# Patient Record
Sex: FEMALE | Race: BLACK OR AFRICAN AMERICAN | NOT HISPANIC OR LATINO | Employment: PART TIME | URBAN - METROPOLITAN AREA
[De-identification: names, ages, dates, MRNs, and addresses within clinical notes are randomized per-mention and may not be internally consistent; named-entity substitution may affect disease eponyms.]

---

## 2017-01-02 ENCOUNTER — THERAPY VISIT (OUTPATIENT)
Dept: PHYSICAL THERAPY | Facility: CLINIC | Age: 32
End: 2017-01-02
Payer: COMMERCIAL

## 2017-01-02 DIAGNOSIS — M25.511 RIGHT SHOULDER PAIN: Primary | ICD-10-CM

## 2017-01-02 PROCEDURE — 97110 THERAPEUTIC EXERCISES: CPT | Mod: GP | Performed by: PHYSICAL THERAPIST

## 2017-01-02 PROCEDURE — 97112 NEUROMUSCULAR REEDUCATION: CPT | Mod: GP | Performed by: PHYSICAL THERAPIST

## 2017-01-12 ENCOUNTER — THERAPY VISIT (OUTPATIENT)
Dept: PHYSICAL THERAPY | Facility: CLINIC | Age: 32
End: 2017-01-12
Payer: COMMERCIAL

## 2017-01-12 DIAGNOSIS — M25.511 RIGHT SHOULDER PAIN: Primary | ICD-10-CM

## 2017-01-12 PROCEDURE — 97110 THERAPEUTIC EXERCISES: CPT | Mod: GP | Performed by: PHYSICAL THERAPIST

## 2017-01-12 PROCEDURE — 97140 MANUAL THERAPY 1/> REGIONS: CPT | Mod: GP | Performed by: PHYSICAL THERAPIST

## 2017-01-30 ENCOUNTER — THERAPY VISIT (OUTPATIENT)
Dept: PHYSICAL THERAPY | Facility: CLINIC | Age: 32
End: 2017-01-30
Payer: COMMERCIAL

## 2017-01-30 DIAGNOSIS — M25.511 RIGHT SHOULDER PAIN: Primary | ICD-10-CM

## 2017-01-30 PROCEDURE — 97110 THERAPEUTIC EXERCISES: CPT | Mod: GP | Performed by: PHYSICAL THERAPIST

## 2017-01-30 PROCEDURE — 97112 NEUROMUSCULAR REEDUCATION: CPT | Mod: GP | Performed by: PHYSICAL THERAPIST

## 2017-04-24 ENCOUNTER — THERAPY VISIT (OUTPATIENT)
Dept: PHYSICAL THERAPY | Facility: CLINIC | Age: 32
End: 2017-04-24
Payer: COMMERCIAL

## 2017-04-24 DIAGNOSIS — M25.511 CHRONIC RIGHT SHOULDER PAIN: ICD-10-CM

## 2017-04-24 DIAGNOSIS — G89.29 CHRONIC RIGHT SHOULDER PAIN: ICD-10-CM

## 2017-04-24 PROCEDURE — 97112 NEUROMUSCULAR REEDUCATION: CPT | Mod: GP | Performed by: PHYSICAL THERAPIST

## 2017-04-24 PROCEDURE — 97110 THERAPEUTIC EXERCISES: CPT | Mod: GP | Performed by: PHYSICAL THERAPIST

## 2017-04-24 NOTE — MR AVS SNAPSHOT
"              After Visit Summary   2017    Ashley Dias    MRN: [id number]           Patient Information     Date Of Birth          1985        Visit Information        Provider Department      2017 1:45 PM Ericka Bell, PT [facility]        Today's Diagnoses     Chronic right shoulder pain           Follow-ups after your visit        Who to contact     If you have questions or need follow up information about today's clinic visit or your schedule please contact [facility] directly at [phone].  Normal or non-critical lab and imaging results will be communicated to you by GOWEXhart, letter or phone within 4 business days after the clinic has received the results. If you do not hear from us within 7 days, please contact the clinic through GOWEXhart or phone. If you have a critical or abnormal lab result, we will notify you by phone as soon as possible.  Submit refill requests through Isentropic or call your pharmacy and they will forward the refill request to us. Please allow 3 business days for your refill to be completed.          Additional Information About Your Visit        MyChart Information     Isentropic lets you send messages to your doctor, view your test results, renew your prescriptions, schedule appointments and more. To sign up, go to [email or web address] . Click on \"Log in\" on the left side of the screen, which will take you to the Welcome page. Then click on \"Sign up Now\" on the right side of the page.     You will be asked to enter the access code listed below, as well as some personal information. Please follow the directions to create your username and password.     Your access code is: [email or web address]  Expires: 2017 12:55 PM     Your access code will  in 90 days. If you need help or a new code, please call your [facility] or [phone].        Care EveryWhere ID     This is your Care EveryWhere ID. This could be used by other organizations to access " your [facility] medical records  [id number]         Blood Pressure from Last 3 Encounters:   12/05/16 114/76   11/21/16 116/72   11/18/16 117/73    Weight from Last 3 Encounters:   12/05/16 81.6 kg (180 lb)   11/21/16 81.6 kg (180 lb)   11/18/16 81.6 kg (180 lb)              We Performed the Following     NEUROMUSCULAR RE-EDUCATION     THERAPEUTIC EXERCISES        Primary Care Provider Fax #    [facility] [phone]       No address on file        Thank you!     Thank you for choosing [facility]  for your care. Our goal is always to provide you with excellent care. Hearing back from our patients is one way we can continue to improve our services. Please take a few minutes to complete the written survey that you may receive in the mail after your visit with us. Thank you!             Your Updated Medication List - Protect others around you: Learn how to safely use, store and throw away your medicines at www.disposemymeds.org.          This list is accurate as of: 4/24/17 11:59 PM.  Always use your most recent med list.                   Brand Name Dispense Instructions for use    COLACE PO          ibuprofen 400 MG tablet    ADVIL/MOTRIN    30 tablet    Take 1-2 tablets (400-800 mg) by mouth every 6 hours as needed for other (cramping)       oxyCODONE 5 MG IR tablet    ROXICODONE    20 tablet    Take 1-2 tablets (5-10 mg) by mouth every 3 hours as needed for moderate to severe pain       prenatal multivitamin  plus iron 27-0.8 MG Tabs per tablet      Take 1 tablet by mouth daily       VITAMIN D (CHOLECALCIFEROL) PO      Take by mouth daily

## 2019-02-25 ENCOUNTER — TELEPHONE (OUTPATIENT)
Dept: OBGYN | Facility: CLINIC | Age: 34
End: 2019-02-25

## 2019-02-25 NOTE — TELEPHONE ENCOUNTER
Pt is requesting to transfer care to [facility] from her previous clinic [facility] due to the fact that she just found out Dr Pina is with our group and she used to see him. Pt states she has been compliant with all prenatal appointments. Pt is 24 weeks or greater.  Last OV with current clinic: 19    Due for next OV : 2-3 weeks     4 Para 3  EDC 19, GA 34w2d    U/S done? Dating and 20 week scan (echogenic cardiac foci) but 2nd U/s was nml    Passed 1hr gtt    Previous C-sec? none    List all major health problems: none    List all complications of past deliveries (GDM, BP, etc):  none    List all current pregnancy issues:  none    List current medication list : PNV  Pt records: in Care Everywhere    Per protocol, message routed to MD, Dr Pina  for direction to okay transfer.      Suzie NIETO R.N.  [facility]

## 2019-02-26 ENCOUNTER — PRENATAL OFFICE VISIT (OUTPATIENT)
Dept: NURSING | Facility: CLINIC | Age: 34
End: 2019-02-26
Payer: COMMERCIAL

## 2019-02-26 DIAGNOSIS — Z34.80 PRENATAL CARE, SUBSEQUENT PREGNANCY: Primary | ICD-10-CM

## 2019-02-26 PROCEDURE — 99207 ZZC NO CHARGE NURSE ONLY: CPT

## 2019-02-26 NOTE — TELEPHONE ENCOUNTER
Pt is scheduled.      I will call her later to go over NPN nurse intake questions.    Suzie NIETO R.N.  [facility]

## 2019-02-26 NOTE — PROGRESS NOTES
Pt attended a NPN one on one nurse visit over the phone.  Pt is , 34w3d GA based off of U/s.  Pt is WING from [facility] as she wants to see Dr Pina.   We went over risk questions, none identified.  Pt has echogenic cardiac foci on U/s but this was cleared after a follow up U/s. Records are in Care Everywhere and labs all viewable in pt chart.    Passed 1 hr gtt (level was 114)  Pt c/o: none  Advised: n/a  Prev hx of : none  Hx of pregnancy complications: none  Hx of delivery complications: none    Last pap 16, NIL (sent to abstraction)    Hx of abnml pap: NO    Went over all information as listed in checklist for 6-12 weeks as well as clinic info and signs/sx to call for.     Suzie NIETO R.N.  [facility]

## 2019-02-26 NOTE — Clinical Note
Please abstract the following data from this visit with this patient into the appropriate field in Epic:Pap smear done on this date: 1/25/16 (approximately), by this group: [facility], results were NIL.  Records in Care Everywhere

## 2019-03-13 ENCOUNTER — PRENATAL OFFICE VISIT (OUTPATIENT)
Dept: OBGYN | Facility: CLINIC | Age: 34
End: 2019-03-13
Payer: COMMERCIAL

## 2019-03-13 VITALS — SYSTOLIC BLOOD PRESSURE: 104 MMHG | DIASTOLIC BLOOD PRESSURE: 62 MMHG | BODY MASS INDEX: 28.15 KG/M2 | WEIGHT: 174.4 LBS

## 2019-03-13 DIAGNOSIS — Z3A.36 36 WEEKS GESTATION OF PREGNANCY: Primary | ICD-10-CM

## 2019-03-13 PROCEDURE — 87653 STREP B DNA AMP PROBE: CPT | Performed by: OBSTETRICS & GYNECOLOGY

## 2019-03-13 PROCEDURE — 90471 IMMUNIZATION ADMIN: CPT | Performed by: OBSTETRICS & GYNECOLOGY

## 2019-03-13 PROCEDURE — 90715 TDAP VACCINE 7 YRS/> IM: CPT | Performed by: OBSTETRICS & GYNECOLOGY

## 2019-03-13 PROCEDURE — 99207 ZZC PRENATAL VISIT: CPT | Performed by: OBSTETRICS & GYNECOLOGY

## 2019-03-13 NOTE — NURSING NOTE
"Chief Complaint   Patient presents with     Prenatal Care     NPN patient : WING from [facility]   36w4d  GBS today    initial /62   Wt 79.1 kg (174 lb 6.4 oz)   LMP 07/25/2018 (Within Days)   BMI 28.15 kg/m   Estimated body mass index is 28.15 kg/m  as calculated from the following:    Height as of 12/5/16: 1.676 m (5' 6\").    Weight as of this encounter: 79.1 kg (174 lb 6.4 oz).  BP completed using cuff size regular.  Marisol Flaherty CMA    "

## 2019-03-13 NOTE — PROGRESS NOTES
WING from [facility] as prior patient of mine.  Pregnancy has been uncomplicated.    EIF was seen on midtrimester U/S but  ECHO normal.    GBS done today.  L&D discussed.  RTC 1 week.

## 2019-03-14 LAB
GP B STREP DNA SPEC QL NAA+PROBE: NEGATIVE
SPECIMEN SOURCE: NORMAL

## 2019-03-19 ENCOUNTER — PRENATAL OFFICE VISIT (OUTPATIENT)
Dept: OBGYN | Facility: CLINIC | Age: 34
End: 2019-03-19

## 2019-03-19 VITALS — DIASTOLIC BLOOD PRESSURE: 58 MMHG | SYSTOLIC BLOOD PRESSURE: 94 MMHG | WEIGHT: 173.4 LBS | BODY MASS INDEX: 27.99 KG/M2

## 2019-03-19 DIAGNOSIS — Z3A.36 36 WEEKS GESTATION OF PREGNANCY: Primary | ICD-10-CM

## 2019-03-19 PROCEDURE — 99207 ZZC PRENATAL VISIT: CPT | Performed by: OBSTETRICS & GYNECOLOGY

## 2019-03-19 NOTE — NURSING NOTE
"Chief Complaint   Patient presents with     Prenatal Care     Transferred from [facility]   37w3d  GBS neg  No concerns today      Initial BP 94/58   Wt 78.7 kg (173 lb 6.4 oz)   LMP 2018 (Within Days)   BMI 27.99 kg/m   Estimated body mass index is 27.99 kg/m  as calculated from the following:    Height as of 16: 1.676 m (5' 6\").    Weight as of this encounter: 78.7 kg (173 lb 6.4 oz).  BP completed using cuff size: large    Questioned patient about current smoking habits.  Pt. has never smoked.          The following HM Due: None    Marisol Flaherty CMA               "

## 2019-03-27 ENCOUNTER — PRENATAL OFFICE VISIT (OUTPATIENT)
Dept: OBGYN | Facility: CLINIC | Age: 34
End: 2019-03-27

## 2019-03-27 VITALS — WEIGHT: 171.2 LBS | SYSTOLIC BLOOD PRESSURE: 90 MMHG | DIASTOLIC BLOOD PRESSURE: 62 MMHG | BODY MASS INDEX: 27.63 KG/M2

## 2019-03-27 DIAGNOSIS — Z34.93 NORMAL PREGNANCY, THIRD TRIMESTER: Primary | ICD-10-CM

## 2019-03-27 PROCEDURE — 99207 ZZC PRENATAL VISIT: CPT | Performed by: OBSTETRICS & GYNECOLOGY

## 2019-04-02 ENCOUNTER — PRENATAL OFFICE VISIT (OUTPATIENT)
Dept: OBGYN | Facility: CLINIC | Age: 34
End: 2019-04-02
Payer: COMMERCIAL

## 2019-04-02 VITALS — DIASTOLIC BLOOD PRESSURE: 62 MMHG | SYSTOLIC BLOOD PRESSURE: 96 MMHG | WEIGHT: 171.5 LBS | BODY MASS INDEX: 27.68 KG/M2

## 2019-04-02 DIAGNOSIS — Z34.93 NORMAL PREGNANCY, THIRD TRIMESTER: Primary | ICD-10-CM

## 2019-04-02 PROCEDURE — 99207 ZZC PRENATAL VISIT: CPT | Performed by: OBSTETRICS & GYNECOLOGY

## 2019-04-02 NOTE — NURSING NOTE
"Chief Complaint   Patient presents with     Prenatal Care   39w3d  Concerned of baby position    Initial BP 96/62   Wt 77.8 kg (171 lb 8 oz)   LMP 2018 (Within Days)   BMI 27.68 kg/m   Estimated body mass index is 27.68 kg/m  as calculated from the following:    Height as of 16: 1.676 m (5' 6\").    Weight as of this encounter: 77.8 kg (171 lb 8 oz).  BP completed using cuff size: regular    Questioned patient about current smoking habits.  Pt. has never smoked.          The following HM Due: NONE    Marisol Flaherty CMA             "

## 2019-04-02 NOTE — PROGRESS NOTES
No problems.  No distinct ctx but more pressure.  Declines cervix check.  RTC 1 week if undelivered.

## 2019-04-08 ENCOUNTER — TELEPHONE (OUTPATIENT)
Dept: OBGYN | Facility: CLINIC | Age: 34
End: 2019-04-08

## 2019-04-08 NOTE — TELEPHONE ENCOUNTER
Spoke with pt. Questions answered.   Pt did have a small BM yesterday.   Pt is having some pressure. Advised that it is probably baby pushing on her pelvis.     She is using Miralax for constipation.    Pt denies contractions.     Baby is active.    Pt has a pn appt tomorrow.     Advised to call back with any changes in her symptoms.     Kamla SORENSEN RN

## 2019-04-09 ENCOUNTER — PRENATAL OFFICE VISIT (OUTPATIENT)
Dept: OBGYN | Facility: CLINIC | Age: 34
End: 2019-04-09
Payer: COMMERCIAL

## 2019-04-09 VITALS — WEIGHT: 171 LBS | BODY MASS INDEX: 27.6 KG/M2 | SYSTOLIC BLOOD PRESSURE: 90 MMHG | DIASTOLIC BLOOD PRESSURE: 60 MMHG

## 2019-04-09 DIAGNOSIS — Z3A.41 POST TERM PREGNANCY, 41 WEEKS: ICD-10-CM

## 2019-04-09 DIAGNOSIS — O48.0 POST TERM PREGNANCY, 41 WEEKS: ICD-10-CM

## 2019-04-09 DIAGNOSIS — Z34.93 NORMAL PREGNANCY, THIRD TRIMESTER: Primary | ICD-10-CM

## 2019-04-09 PROCEDURE — 99207 ZZC PRENATAL VISIT: CPT | Performed by: OBSTETRICS & GYNECOLOGY

## 2019-04-09 PROCEDURE — 59425 ANTEPARTUM CARE ONLY: CPT | Performed by: OBSTETRICS & GYNECOLOGY

## 2019-04-09 NOTE — NURSING NOTE
"Chief Complaint   Patient presents with     Prenatal Care     40 3/7 weeks       Initial BP 90/60   Wt 77.6 kg (171 lb)   LMP 2018 (Within Days)   BMI 27.60 kg/m   Estimated body mass index is 27.6 kg/m  as calculated from the following:    Height as of 16: 1.676 m (5' 6\").    Weight as of this encounter: 77.6 kg (171 lb).  BP completed using cuff size: regular    Questioned patient about current smoking habits.  Pt. has never smoked.          The following HM Due: NONE    +fetal movement  -swelling  -contractions    Mary Guerrero, CMA         "

## 2019-04-09 NOTE — PROGRESS NOTES
No problems.  Baby active.    Discussed postdates management.  Induction at 41 weeks offered and declined.  RTC 1 week with BPP.  Induction at 42 weeks advised.

## 2019-04-16 ENCOUNTER — HOSPITAL ENCOUNTER (INPATIENT)
Facility: CLINIC | Age: 34
LOS: 2 days | Discharge: HOME | End: 2019-04-18
Attending: OBSTETRICS & GYNECOLOGY | Admitting: OBSTETRICS & GYNECOLOGY
Payer: COMMERCIAL

## 2019-04-16 ENCOUNTER — NURSE TRIAGE (OUTPATIENT)
Dept: NURSING | Facility: CLINIC | Age: 34
End: 2019-04-16

## 2019-04-16 LAB
ABO + RH BLD: NORMAL
ABO + RH BLD: NORMAL
BLD GP AB SCN SERPL QL: NORMAL
BLOOD BANK CMNT PATIENT-IMP: NORMAL
HGB BLD-MCNC: 12.5 G/DL (ref 11.7–15.7)
SPECIMEN EXP DATE BLD: NORMAL

## 2019-04-16 PROCEDURE — 86850 RBC ANTIBODY SCREEN: CPT | Performed by: OBSTETRICS & GYNECOLOGY

## 2019-04-16 PROCEDURE — 25000125 ZZHC RX 250: Performed by: OBSTETRICS & GYNECOLOGY

## 2019-04-16 PROCEDURE — 86901 BLOOD TYPING SEROLOGIC RH(D): CPT | Performed by: OBSTETRICS & GYNECOLOGY

## 2019-04-16 PROCEDURE — 0KQM0ZZ REPAIR PERINEUM MUSCLE, OPEN APPROACH: ICD-10-PCS | Performed by: OBSTETRICS & GYNECOLOGY

## 2019-04-16 PROCEDURE — 85018 HEMOGLOBIN: CPT | Performed by: OBSTETRICS & GYNECOLOGY

## 2019-04-16 PROCEDURE — 25800030 ZZH RX IP 258 OP 636: Performed by: OBSTETRICS & GYNECOLOGY

## 2019-04-16 PROCEDURE — 25000128 H RX IP 250 OP 636

## 2019-04-16 PROCEDURE — 25000132 ZZH RX MED GY IP 250 OP 250 PS 637: Performed by: OBSTETRICS & GYNECOLOGY

## 2019-04-16 PROCEDURE — 36415 COLL VENOUS BLD VENIPUNCTURE: CPT | Performed by: OBSTETRICS & GYNECOLOGY

## 2019-04-16 PROCEDURE — 12000000 ZZH R&B MED SURG/OB

## 2019-04-16 PROCEDURE — 40000083 ZZH STATISTIC IP LACTATION SERVICES 1-15 MIN

## 2019-04-16 PROCEDURE — 59410 OBSTETRICAL CARE: CPT | Performed by: OBSTETRICS & GYNECOLOGY

## 2019-04-16 PROCEDURE — 86900 BLOOD TYPING SEROLOGIC ABO: CPT | Performed by: OBSTETRICS & GYNECOLOGY

## 2019-04-16 PROCEDURE — 10907ZC DRAINAGE OF AMNIOTIC FLUID, THERAPEUTIC FROM PRODUCTS OF CONCEPTION, VIA NATURAL OR ARTIFICIAL OPENING: ICD-10-PCS | Performed by: OBSTETRICS & GYNECOLOGY

## 2019-04-16 PROCEDURE — 72200001 ZZH LABOR CARE VAGINAL DELIVERY SINGLE

## 2019-04-16 RX ORDER — OXYTOCIN/0.9 % SODIUM CHLORIDE 30/500 ML
100-340 PLASTIC BAG, INJECTION (ML) INTRAVENOUS CONTINUOUS PRN
Status: COMPLETED | OUTPATIENT
Start: 2019-04-16 | End: 2019-04-16

## 2019-04-16 RX ORDER — IBUPROFEN 800 MG/1
800 TABLET, FILM COATED ORAL
Status: COMPLETED | OUTPATIENT
Start: 2019-04-16 | End: 2019-04-16

## 2019-04-16 RX ORDER — OXYTOCIN 10 [USP'U]/ML
10 INJECTION, SOLUTION INTRAMUSCULAR; INTRAVENOUS
Status: DISCONTINUED | OUTPATIENT
Start: 2019-04-16 | End: 2019-04-18 | Stop reason: HOSPADM

## 2019-04-16 RX ORDER — ONDANSETRON 2 MG/ML
4 INJECTION INTRAMUSCULAR; INTRAVENOUS EVERY 6 HOURS PRN
Status: DISCONTINUED | OUTPATIENT
Start: 2019-04-16 | End: 2019-04-16

## 2019-04-16 RX ORDER — ACETAMINOPHEN 325 MG/1
650 TABLET ORAL EVERY 4 HOURS PRN
Status: DISCONTINUED | OUTPATIENT
Start: 2019-04-16 | End: 2019-04-18 | Stop reason: HOSPADM

## 2019-04-16 RX ORDER — FENTANYL CITRATE 50 UG/ML
INJECTION, SOLUTION INTRAMUSCULAR; INTRAVENOUS
Status: COMPLETED
Start: 2019-04-16 | End: 2019-04-16

## 2019-04-16 RX ORDER — OXYTOCIN 10 [USP'U]/ML
10 INJECTION, SOLUTION INTRAMUSCULAR; INTRAVENOUS
Status: DISCONTINUED | OUTPATIENT
Start: 2019-04-16 | End: 2019-04-16

## 2019-04-16 RX ORDER — AMOXICILLIN 250 MG
1 CAPSULE ORAL 2 TIMES DAILY
Status: DISCONTINUED | OUTPATIENT
Start: 2019-04-16 | End: 2019-04-18 | Stop reason: HOSPADM

## 2019-04-16 RX ORDER — OXYCODONE AND ACETAMINOPHEN 5; 325 MG/1; MG/1
1 TABLET ORAL
Status: DISCONTINUED | OUTPATIENT
Start: 2019-04-16 | End: 2019-04-16

## 2019-04-16 RX ORDER — BISACODYL 10 MG
10 SUPPOSITORY, RECTAL RECTAL DAILY PRN
Status: DISCONTINUED | OUTPATIENT
Start: 2019-04-18 | End: 2019-04-18 | Stop reason: HOSPADM

## 2019-04-16 RX ORDER — NALOXONE HYDROCHLORIDE 0.4 MG/ML
.1-.4 INJECTION, SOLUTION INTRAMUSCULAR; INTRAVENOUS; SUBCUTANEOUS
Status: DISCONTINUED | OUTPATIENT
Start: 2019-04-16 | End: 2019-04-18 | Stop reason: HOSPADM

## 2019-04-16 RX ORDER — NALOXONE HYDROCHLORIDE 0.4 MG/ML
.1-.4 INJECTION, SOLUTION INTRAMUSCULAR; INTRAVENOUS; SUBCUTANEOUS
Status: DISCONTINUED | OUTPATIENT
Start: 2019-04-16 | End: 2019-04-16

## 2019-04-16 RX ORDER — METHYLERGONOVINE MALEATE 0.2 MG/ML
200 INJECTION INTRAVENOUS
Status: DISCONTINUED | OUTPATIENT
Start: 2019-04-16 | End: 2019-04-16

## 2019-04-16 RX ORDER — OXYTOCIN/0.9 % SODIUM CHLORIDE 30/500 ML
100 PLASTIC BAG, INJECTION (ML) INTRAVENOUS CONTINUOUS
Status: DISCONTINUED | OUTPATIENT
Start: 2019-04-16 | End: 2019-04-18 | Stop reason: HOSPADM

## 2019-04-16 RX ORDER — IBUPROFEN 800 MG/1
800 TABLET, FILM COATED ORAL EVERY 6 HOURS PRN
Status: DISCONTINUED | OUTPATIENT
Start: 2019-04-16 | End: 2019-04-18 | Stop reason: HOSPADM

## 2019-04-16 RX ORDER — LANOLIN 100 %
OINTMENT (GRAM) TOPICAL
Status: DISCONTINUED | OUTPATIENT
Start: 2019-04-16 | End: 2019-04-18 | Stop reason: HOSPADM

## 2019-04-16 RX ORDER — FENTANYL CITRATE 50 UG/ML
50-100 INJECTION, SOLUTION INTRAMUSCULAR; INTRAVENOUS
Status: DISCONTINUED | OUTPATIENT
Start: 2019-04-16 | End: 2019-04-16

## 2019-04-16 RX ORDER — AMOXICILLIN 250 MG
2 CAPSULE ORAL 2 TIMES DAILY
Status: DISCONTINUED | OUTPATIENT
Start: 2019-04-16 | End: 2019-04-18 | Stop reason: HOSPADM

## 2019-04-16 RX ORDER — ACETAMINOPHEN 325 MG/1
650 TABLET ORAL EVERY 4 HOURS PRN
Status: DISCONTINUED | OUTPATIENT
Start: 2019-04-16 | End: 2019-04-16

## 2019-04-16 RX ORDER — CARBOPROST TROMETHAMINE 250 UG/ML
250 INJECTION, SOLUTION INTRAMUSCULAR
Status: DISCONTINUED | OUTPATIENT
Start: 2019-04-16 | End: 2019-04-16

## 2019-04-16 RX ORDER — HYDROCORTISONE 2.5 %
CREAM (GRAM) TOPICAL 3 TIMES DAILY PRN
Status: DISCONTINUED | OUTPATIENT
Start: 2019-04-16 | End: 2019-04-18 | Stop reason: HOSPADM

## 2019-04-16 RX ORDER — SODIUM CHLORIDE, SODIUM LACTATE, POTASSIUM CHLORIDE, CALCIUM CHLORIDE 600; 310; 30; 20 MG/100ML; MG/100ML; MG/100ML; MG/100ML
INJECTION, SOLUTION INTRAVENOUS CONTINUOUS
Status: DISCONTINUED | OUTPATIENT
Start: 2019-04-16 | End: 2019-04-16

## 2019-04-16 RX ORDER — OXYTOCIN/0.9 % SODIUM CHLORIDE 30/500 ML
340 PLASTIC BAG, INJECTION (ML) INTRAVENOUS CONTINUOUS PRN
Status: DISCONTINUED | OUTPATIENT
Start: 2019-04-16 | End: 2019-04-18 | Stop reason: HOSPADM

## 2019-04-16 RX ADMIN — IBUPROFEN 800 MG: 800 TABLET ORAL at 08:29

## 2019-04-16 RX ADMIN — FENTANYL CITRATE 100 MCG: 50 INJECTION, SOLUTION INTRAMUSCULAR; INTRAVENOUS at 03:44

## 2019-04-16 RX ADMIN — IBUPROFEN 800 MG: 800 TABLET ORAL at 20:26

## 2019-04-16 RX ADMIN — OXYTOCIN-SODIUM CHLORIDE 0.9% IV SOLN 30 UNIT/500ML 100 ML/HR: 30-0.9/5 SOLUTION at 05:02

## 2019-04-16 RX ADMIN — OXYTOCIN-SODIUM CHLORIDE 0.9% IV SOLN 30 UNIT/500ML 340 ML/HR: 30-0.9/5 SOLUTION at 04:25

## 2019-04-16 RX ADMIN — SODIUM CHLORIDE, POTASSIUM CHLORIDE, SODIUM LACTATE AND CALCIUM CHLORIDE: 600; 310; 30; 20 INJECTION, SOLUTION INTRAVENOUS at 03:49

## 2019-04-16 RX ADMIN — ACETAMINOPHEN 650 MG: 325 TABLET, FILM COATED ORAL at 04:45

## 2019-04-16 RX ADMIN — FENTANYL CITRATE 100 MCG: 50 INJECTION INTRAMUSCULAR; INTRAVENOUS at 03:44

## 2019-04-16 RX ADMIN — SENNOSIDES AND DOCUSATE SODIUM 1 TABLET: 8.6; 5 TABLET ORAL at 20:26

## 2019-04-16 RX ADMIN — LIDOCAINE HYDROCHLORIDE 10 ML: 10 INJECTION, SOLUTION EPIDURAL; INFILTRATION; INTRACAUDAL; PERINEURAL at 04:28

## 2019-04-16 RX ADMIN — IBUPROFEN 800 MG: 800 TABLET ORAL at 14:09

## 2019-04-16 RX ADMIN — SENNOSIDES AND DOCUSATE SODIUM 1 TABLET: 8.6; 5 TABLET ORAL at 08:24

## 2019-04-16 RX ADMIN — IBUPROFEN 800 MG: 800 TABLET ORAL at 04:44

## 2019-04-16 ASSESSMENT — MIFFLIN-ST. JEOR: SCORE: 1525.54

## 2019-04-16 NOTE — PLAN OF CARE
Data: Ashley Dias transferred to [facility] via wheelchair at 0645. Baby transferred via parent's arms.  Action: Receiving unit notified of transfer: Yes. Patient and family notified of room change. Report given to Mariluz ROBIN at 0715. Belongings sent to receiving unit. Accompanied by Registered Nurse. Oriented patient to surroundings. Call light within reach. ID bands double-checked with receiving RN.  Response: Patient tolerated transfer and is stable.

## 2019-04-16 NOTE — L&D DELIVERY NOTE
"Delivery Summary    Ashley Dias MRN# [id number]   Age: 33 year old YOB: 1985     ASSESSMENT & PLAN: routine postpartum cares       Labor Event Times    Labor onset date:  19 Onset time:   1:00 AM   Dilation complete date:  19 Complete time:   4:04 AM   Start pushing date/time:  2019 0411      Labor Events     labor?:  No   steroids:  None  Labor Type:  Spontaneous  Predominate monitoring during 1st stage:  continuous electronic fetal monitoring     Antibiotics received during labor?:  No     Rupture date/time: 19 0404   Rupture type:  Artificial Rupture of Membranes  Fluid color:  Clear  Fluid odor:  Normal     1:1 continuous labor support provided by?:  RN       Delivery/Placenta Date and Time    Delivery Date:  19 Delivery Time:   4:18 AM   Placenta Date/Time:  2019  4:25 AM  Oxytocin given at the time of delivery:  after delivery of placenta     Vaginal Counts     Initial count performed by 2 team members:   Two Team Members   Ameena Taylor       Needles Suture [employer] Sponges Instruments   Initial counts 2  5    Added to count       Final counts       Placed during labor Accounted for at the end of labor   No NA   No NA   No NA    Final count performed by 2 team members:   Two Team Members   Ameena Taylor      Final count correct?:  Yes     Apgars    Living status:  Living   1 Minute 5 Minute 10 Minute 15 Minute 20 Minute   Skin color: 0  1       Heart rate: 2  2       Reflex irritability: 2  2       Muscle tone: 1  2       Respiratory effort: 2  2       Total: 7  9       Apgars assigned by:  ASHVIN ROBIN FOR 1\" AND SHI HURD,CNP FOR 5\"     Cord    Vessels:  3 Vessels Complications:  Nuchal      [place] Resuscitation    Methods:  None   Care at Delivery:  NNP asked to attend this  by Dl Taylor MD due to fentanayl dose give just 20\" prior.  Infant cried upon delivery with mild stimulation, " delayed cord clamping for ~ 45 seconds.  Infant was brought to the warmer, dried and stimulated with good response in tone, respiratory effort and color.  Breath sounds clear and equal and pink in room air.  Routine care for term infant.    Karina Parson SHI CNP  19 @ 0438 AM      Measurements    Weight:  8 lb 13.5 oz       Skin to Skin and Feeding Plan    Skin to skin initiation date/time: 1841    Skin to skin with:  Mother  Skin to skin end date/time:     How do you plan to feed your baby:  Breastfeeding     Labor Events and Shoulder Dystocia    Fetal Tracing Prior to Delivery:  Category 1  Shoulder dystocia present?:  Neg     Delivery (Maternal) (Provider to Complete) ([id number])    Episiotomy:  None  Perineal lacerations:  2nd Repaired?:  Yes   Vaginal laceration?:  No    Cervical laceration?:  No       Blood Loss  Mother: Ashley Dias #[id number]   Start of Mother's Information    IO Blood Loss  19 0100 - 19 0444    Total QBL Blood Loss (mL) Hospital Encounter 324 mL    Total  324 mL         End of Mother's Information  Mother: Ashley Dias #[id number]         Delivery - Provider to Complete (209999)    Delivering clinician:  Dl Taylor MD  Attempted Delivery Types (Choose all that apply):  Spontaneous Vaginal Delivery  Delivery Type (Choose the 1 that will go to the Birth History):  Vaginal, Spontaneous          Placenta    Delayed Cord Clamping:  Done  Date/Time:  2019  4:25 AM  Removal:  Spontaneous  Comments:  nuchal cord x's 1 reduced on perineum prior to delivery of the shoulders  Disposition:  Hospital disposal     Anesthesia    Method:  INTRAVENOUS REGIONAL   Analgesic:   BIRTH HISTORY: ANALGESIC   FENTANYL (BULK CHEMICALS - F'S)         Presentation and Position    Presentation:  Vertex   Occiput Anterior           Dl Taylor MD

## 2019-04-16 NOTE — PLAN OF CARE
Data: Vital signs within normal limits. Postpartum checks within normal limits - see flow record. Patient eating and drinking normally. Patient able to empty bladder independently and is up ambulating. Using amberly bottle  Patient performing self cares and is able to care for infant.  Action: Patient medicated during the shift for perineal discomfort and abdominal cramping See MAR. Patient reassessed within 1 hour after each medication and pain was improved - patient stated she was comfortable. Patient education done about clip board   Response: Positive attachment behaviors observed with infant.   Plan: Anticipate discharge on Thursday

## 2019-04-16 NOTE — TELEPHONE ENCOUNTER
" of 34 y/o female calls while driving, he is driving his wife tot  ER at [facility] and is calling to inform me that his wife has been having contractions for >2 hrs and the contractions are now  2-3 minutes apart, this is bay #4    RN advised to go to L&D and be evaluated, they are already on the way, RN called L&D at [facility] [phone] at 2:30AM and spoke with SHARDA Gomez  to let them know they are coming, Dr. Pina is the OB/GYN,     Jing Cassy, RN - [place] Nurse Advisor  5/17/2019  2:32AM      Reason for Disposition    [1] History of prior delivery (multipara) AND [2] contractions < 10 minutes apart AND [3] present 1 hour    Additional Information    Negative: Passed out (i.e., lost consciousness, collapsed and was not responding)    Negative: Shock suspected (e.g., cold/pale/clammy skin, too weak to stand, low BP, rapid pulse)    Negative: Difficult to awaken or acting confused  (e.g., disoriented, slurred speech)    Negative: [1] SEVERE abdominal pain (e.g., excruciating) AND [2] constant AND [3] present > 1 hour    Negative: Severe bleeding (e.g., continuous red blood from vagina, or large blood clots)    Negative: Umbilical cord hanging out of the vagina (shiny, white, curled appearance, \"like telephone cord\")    Negative: Uncontrollable urge to push (i.e., feels like baby is coming out now)    Negative: Can see baby    Negative: Sounds like a life-threatening emergency to the triager    Negative: [1] First baby (primipara) AND [2] contractions < 6 minutes apart  AND [3] present 2 hours    Protocols used: PREGNANCY - LABOR-ADULT-AH      "

## 2019-04-16 NOTE — H&P
"Ashley Dias is a 33 year old female  Estimated Date of Delivery: 2019  at 41.3 weeks admitted for eval of onset of labor. No significant change in general health status based on examination of the patient, review of Nursing Admission Database and prenatal record.  Past Medical History:   Diagnosis Date     Constipation      Dislocation     R shoulder chronic      ROS: 10 point ROS neg other than the symptoms noted above in the HPI.  Temp 97.9  F (36.6  C) (Oral)   Resp 16   Ht 1.676 m (5' 6\")   Wt 79.4 kg (175 lb)   LMP 2018 (Within Days)   Breastfeeding? No   BMI 28.25 kg/m    Constitutional: healthy, alert and mild distress in labor  Genitourinary: Normal external genitalia without lesions and cx 8 cm  100% effaced vtx 0 sta  FHT's Cat 1      EFW: 7lb 8oz    A/P: IUP 41.3 weeks active labor  ARIOM clear  Anticipate vag del  "

## 2019-04-17 PROCEDURE — 12000000 ZZH R&B MED SURG/OB

## 2019-04-17 PROCEDURE — 25000132 ZZH RX MED GY IP 250 OP 250 PS 637: Performed by: OBSTETRICS & GYNECOLOGY

## 2019-04-17 RX ADMIN — IBUPROFEN 800 MG: 800 TABLET ORAL at 04:05

## 2019-04-17 RX ADMIN — SENNOSIDES AND DOCUSATE SODIUM 2 TABLET: 8.6; 5 TABLET ORAL at 09:07

## 2019-04-17 RX ADMIN — ACETAMINOPHEN 650 MG: 325 TABLET, FILM COATED ORAL at 16:45

## 2019-04-17 RX ADMIN — ACETAMINOPHEN 650 MG: 325 TABLET, FILM COATED ORAL at 09:07

## 2019-04-17 RX ADMIN — IBUPROFEN 800 MG: 800 TABLET ORAL at 21:18

## 2019-04-17 RX ADMIN — SENNOSIDES AND DOCUSATE SODIUM 1 TABLET: 8.6; 5 TABLET ORAL at 21:19

## 2019-04-17 NOTE — PLAN OF CARE
Pt up and shaneka. Voiding without difficulty. Bonding well with baby, responding to infant cues. Breast and bottle feeding per mother request. Fundus firm and midline. Tylenol and ibuprofen effective for pain management. Tucks for amberly relief, ice offered. Education done. Continue to monitor.    Maria Del Rosario Arizmendi

## 2019-04-17 NOTE — PLAN OF CARE
Patient VSS. Independent, Walking. Breastfeeding and supplementing with formula. Bonding well with baby. Pain controlled with PRN ibuprofen. Education and support provided. Continue to monitor and assist per pt care plan and needs.

## 2019-04-17 NOTE — PROGRESS NOTES
[facility]    Post-Partum Progress Note    Assessment & Plan   Assessment:  Post-partum day #1  Normal spontaneous vaginal delivery    Doing well.  No complications identified.  No excessive bleeding  Pain well-controlled.    Plan:  Routine post partum cares  Pain control measures as needed  Discharge PPD#2    Theresa Maddox DO  OBGYN    Interval History   Doing well.  Pain is well-controlled.  No fevers.  No history of foul-smelling vaginal discharge.  Good appetite.  Denies chest pain, shortness of breath, nausea or vomiting.  Vaginal bleeding is similar to a moderate menstrual flow.  Ambulatory.     Medications     - MEDICATION INSTRUCTIONS -       NO Rho (D) immune globulin (RhoGam) needed - mother Rh POSITIVE       - MEDICATION INSTRUCTIONS -       oxytocin in 0.9% NaCl 100 mL/hr (04/16/19 0502)     oxytocin in 0.9% NaCl         senna-docusate  1 tablet Oral BID    Or     senna-docusate  2 tablet Oral BID       Physical Exam   Temp: 97.8  F (36.6  C) Temp src: Oral BP: 113/52 Pulse: 74 Heart Rate: 77 Resp: 16        Vitals:    04/16/19 0353   Weight: 79.4 kg (175 lb)     Vital Signs with Ranges  Temp:  [97.8  F (36.6  C)-98.2  F (36.8  C)] 97.8  F (36.6  C)  Pulse:  [74-87] 74  Heart Rate:  [77] 77  Resp:  [16] 16  BP: (101-114)/(48-63) 113/52  No intake/output data recorded.    General easily aroused, conversant  Uterine fundus is firm, non-tender and below the level of the umbilicus  Extremities Non-tender, trace edema  Heart good perfusion  Lungs no labored breathing    Data   Recent Labs   Lab Test 04/16/19 0355   ABO A   RH Pos   AS Neg     Recent Labs   Lab Test 04/16/19 0355   HGB 12.5     Recent Labs   Lab Test 01/25/16   RUQIGG Immune

## 2019-04-17 NOTE — PLAN OF CARE
VSS, cramping pain is controlled with ibuprofen, ambulating in room, IV is out, attempting to breast feed but primarily using formula to feed her baby; encouraged pt to work on the birth certificate and PPD; reviewed 24 hr expectations with pt.

## 2019-04-18 ENCOUNTER — NURSE TRIAGE (OUTPATIENT)
Dept: NURSING | Facility: CLINIC | Age: 34
End: 2019-04-18

## 2019-04-18 VITALS
BODY MASS INDEX: 28.12 KG/M2 | TEMPERATURE: 97.7 F | RESPIRATION RATE: 16 BRPM | HEART RATE: 94 BPM | SYSTOLIC BLOOD PRESSURE: 111 MMHG | WEIGHT: 175 LBS | DIASTOLIC BLOOD PRESSURE: 59 MMHG | HEIGHT: 66 IN

## 2019-04-18 PROCEDURE — 40000083 ZZH STATISTIC IP LACTATION SERVICES 1-15 MIN

## 2019-04-18 PROCEDURE — 25000132 ZZH RX MED GY IP 250 OP 250 PS 637: Performed by: OBSTETRICS & GYNECOLOGY

## 2019-04-18 RX ADMIN — IBUPROFEN 800 MG: 800 TABLET ORAL at 11:22

## 2019-04-18 RX ADMIN — SENNOSIDES AND DOCUSATE SODIUM 1 TABLET: 8.6; 5 TABLET ORAL at 09:22

## 2019-04-18 RX ADMIN — IBUPROFEN 800 MG: 800 TABLET ORAL at 03:44

## 2019-04-18 NOTE — PLAN OF CARE
VSS, cramping pain is controlled with pharmacological interventions and t--pump, breast feeding and supplementing her infant with formula; talked about discharge expectations, encouraged to work on paperwork but pt told she would do it tomorrow, since she does not have proper glasses.

## 2019-04-18 NOTE — PLAN OF CARE
Discharge instructions,when to call the doctor, and post partum follow up reviewed. Patient and spouse questions answered, with understanding verbalized. Patient denies further questions at this time. Patient discharged to home with family and infant at 1415.

## 2019-04-18 NOTE — PLAN OF CARE
Pt meeting expected outcomes. Vitals stable. Fundus firm and midline. Bleeding WNL. Ibuprofen controlling pain. Breast and bottle feeding . Independent with self and baby cares. Anticipated discharge home today.

## 2019-04-18 NOTE — TELEPHONE ENCOUNTER
"Patient reports she was told to take ibuprofen and stool softener after discharge from the hospital.  Patient says she did not get a prescription and has some questions.  FNA answered her questions about frequency, dose, and use.  FNA advised to have pharmacist help her if she cannot find it at the store. FNA advised to call back if patient has questions. Caller verbalizes understanding.      Additional Information    Negative: Drug overdose and nurse unable to answer question    Negative: Caller requesting information not related to medicine    Negative: Caller requesting a prescription for Strep throat and has a positive culture result    Negative: Rash while taking a medication or within 3 days of stopping it    Negative: Immunization reaction suspected    Negative: [1] Asthma and [2] having symptoms of asthma (cough, wheezing, etc)    Negative: MORE THAN A DOUBLE DOSE of a prescription or over-the-counter (OTC) drug    Negative: [1] DOUBLE DOSE (an extra dose or lesser amount) of over-the-counter (OTC) drug AND [2] any symptoms (e.g., dizziness, nausea, pain, sleepiness)    Negative: [1] DOUBLE DOSE (an extra dose or lesser amount) of prescription drug AND [2] any symptoms (e.g., dizziness, nausea, pain, sleepiness)    Negative: Took another person's prescription drug    Negative: [1] DOUBLE DOSE (an extra dose or lesser amount) of prescription drug AND [2] NO symptoms (Exception: a double dose of antibiotics)    Negative: Diabetes drug error or overdose (e.g., insulin or extra dose)    Negative: [1] Request for URGENT new prescription or refill of \"essential\" medication (i.e., likelihood of harm to patient if not taken) AND [2] triager unable to fill per unit policy    Negative: [1] Prescription not at pharmacy AND [2] was prescribed today by PCP    Negative: Pharmacy calling with prescription questions and triager unable to answer question    Negative: Caller has URGENT medication question about med that PCP " prescribed and triager unable to answer question    Negative: Caller has NON-URGENT medication question about med that PCP prescribed and triager unable to answer question    Negative: Caller requesting a NON-URGENT new prescription or refill and triager unable to refill per unit policy    Negative: Caller has medication question about med not prescribed by PCP and triager unable to answer question (e.g., compatibility with other med, storage)    Negative: [1] DOUBLE DOSE (an extra dose or lesser amount) of over-the-counter (OTC) drug AND [2] NO symptoms (all triage questions negative)    Negative: [1] DOUBLE DOSE (an extra dose or lesser amount) of antibiotic drug AND [2] NO symptoms (all triage questions negative)    Caller has medication question only, adult not sick, and triager answers question    Protocols used: [facility]ST[facility]

## 2019-04-18 NOTE — PROGRESS NOTES
Public Health Nurse (PHN) met with patient, discussed resources within [facility].  Provided family resources of [facility] Public Health services resource card, home visiting card, community resource guide and car seat information card given and discussed.  Family is aware how to add baby to insurance (phone number given at [facility] METS team) and have a primary provider arranged for baby.  Offered referral for home visiting, family declined. Patient declined any questions or concerns.

## 2019-04-18 NOTE — PLAN OF CARE
Data: Vital signs within normal limits. Postpartum checks within normal limits - see flow record. Patient eating and drinking normally. Patient able to empty bladder independently and is up ambulating. No apparent signs of infection. Perineal laceration  healing well. Patient performing self cares and is able to care for infant.  Action: Patient medicated during the shift for pain and cramping. See MAR. Patient reassessed within 1 hour after each medication and pain was improved - patient stated she was comfortable. Patient education done about breastfeeding, self cares, infant cares, resources for home. See flow record.  Response: Positive attachment behaviors observed with infant. Support persons are not present.   Plan: Anticipate discharge later today 4/18/19.

## 2019-04-18 NOTE — DISCHARGE INSTRUCTIONS
Postpartum pain control:    You will likely experience cramping for 1-2 weeks.   You can take up to 3 tabs of ibuprofen (600mg) every 6 hours as needed for pain.  You can additionally take 1-2 tabs of tylenol (325-650mg regular strength 500-1000mg extra strength), every 6 hours for additional pain control as needed.  It is best to alternate your medications so you are taking something every 3 hours if you are having continued pain.    If you have vaginal pain you can take sitz baths 1-2x/day. Fill the tub with 2-3 inches of warm water and soak the perineal and vaginal area for 10 minutes. Ice or warm packs can also be applied for comfort.    Please call the office for:  Temperature above 100.4  Severe headache, especially with changes in your vision  Heavy bleeding (more than one pad an hour for more than 2 hours in a row)  Any other new, concerning symptoms.    Constipation  You may use the following products to ease constipation:    1. Stool softeners such as metamucil or benefiber  2. senna 1-2 times daily  3. Fiber supplements  4. miralax (over the counter).  5. Dulcolax    Please be sure to keep adequately hydrated; 6-8 8oz glasses daily, more if needed to compensate for exercise, sweating, etc.      More specific dosing can be found below:    - Metamucil 28g daily PO with 8oz of water  - senna-docusate 8.6-50 MG per tablet PO 1 tablet, Oral, 2 TIMES DAILY, Start with 1 tablet PO BID, reduce to 1 tablet daily when having daily BMs. Stop for loose stools.  - docusate sodium (COLACE) capsule 100 mg, Oral, 2 TIMES DAILY, To prevent constipation. Hold for loose stools.  - bisacodyl (DULCOLAX) suppository 10 mg, Rectal, DAILY PRN, constipation, Hold for loose stools.       Please contact the clinic with any questions.  Please schedule a follow up appointment with your primary OB/Gyn provider in 6 weeks.   You can respond with [facility] or call [facility] at [phone].    Postpartum Vaginal Delivery  Instructions  Lactation: [phone]    Activity       Ask family and friends for help when you need it.    Do not place anything in your vagina for 6 weeks.    You are not restricted on other activities, but take it easy for a few weeks to allow your body to recover from delivery.  You are able to do any activities you feel up to that point.    No driving until you have stopped taking your pain medications (usually two weeks after delivery).     Call your health care provider if you have any of these symptoms:       Increased pain, swelling, redness, or fluid around your stiches from an episiotomy or perineal tear.    A fever above 100.4 F (38 C) with or without chills when placing a thermometer under your tongue.    You soak a sanitary pad with blood within 1 hour, or you see blood clots larger than a golf ball.    Bleeding that lasts more than 6 weeks.    Vaginal discharge that smells bad.    Severe pain, cramping or tenderness in your lower belly area.    A need to urinate more frequently (use the toilet more often), more urgently (use the toilet very quickly), or it burns when you urinate.    Nausea and vomiting.    Redness, swelling or pain around a vein in your leg.    Problems breastfeeding or a red or painful area on your breast.    Chest pain and cough or are gasping for air.    Problems coping with sadness, anxiety, or depression.  If you have any concerns about hurting yourself or the baby, call your provider immediately.     You have questions or concerns after you return home.     Keep your hands clean:  Always wash your hands before touching your perineal area and stitches.  This helps reduce your risk of infection.  If your hands aren't dirty, you may use an alcohol hand-rub to clean your hands. Keep your nails clean and short.

## 2019-04-18 NOTE — DISCHARGE SUMMARY
[facility]    Post-Partum Discharge Summary    Assessment & Plan   Assessment:  Post-partum day #2  Normal spontaneous vaginal delivery    Doing well.  No excessive bleeding  Pain well-controlled.  Breastfeeding and supplementing, doing well.    Plan:  Breast feeding strategies discussed  Discharge later today  Follow up 6 weeks.    Jennifer Lazo MD    Interval History   Doing well.  Pain is well-controlled.  No fevers.  No history of foul-smelling vaginal discharge.  Good appetite.  Denies chest pain, shortness of breath, nausea or vomiting.  Vaginal bleeding is similar to a heavy menstrual flow.  Ambulatory.  Breastfeeding well.    Medications     - MEDICATION INSTRUCTIONS -       NO Rho (D) immune globulin (RhoGam) needed - mother Rh POSITIVE       - MEDICATION INSTRUCTIONS -       oxytocin in 0.9% NaCl 100 mL/hr (04/16/19 0502)     oxytocin in 0.9% NaCl         senna-docusate  1 tablet Oral BID    Or     senna-docusate  2 tablet Oral BID       Physical Exam   Temp: 97.7  F (36.5  C) Temp src: Oral BP: 111/59 Pulse: 94   Resp: 16        Vitals:    04/16/19 0353   Weight: 79.4 kg (175 lb)     Vital Signs with Ranges  Temp:  [97.7  F (36.5  C)-98.6  F (37  C)] 97.7  F (36.5  C)  Pulse:  [82-94] 94  Resp:  [16-18] 16  BP: (103-111)/(49-59) 111/59  No intake/output data recorded.    Uterine fundus is firm, non-tender and at the level of the umbilicus  Extremities Non-tender    Data   Recent Labs   Lab Test 04/16/19 0355   ABO A   RH Pos   AS Neg     Recent Labs   Lab Test 04/16/19 0355   HGB 12.5     Recent Labs   Lab Test 01/25/16   RUQIGG Immune

## 2019-07-12 ENCOUNTER — PRENATAL OFFICE VISIT (OUTPATIENT)
Dept: OBGYN | Facility: CLINIC | Age: 34
End: 2019-07-12
Payer: COMMERCIAL

## 2019-07-12 VITALS — WEIGHT: 165.1 LBS | DIASTOLIC BLOOD PRESSURE: 64 MMHG | SYSTOLIC BLOOD PRESSURE: 96 MMHG | BODY MASS INDEX: 26.65 KG/M2

## 2019-07-12 DIAGNOSIS — M25.50 MULTIPLE JOINT PAIN: ICD-10-CM

## 2019-07-12 DIAGNOSIS — Z12.4 SCREENING FOR CERVICAL CANCER: ICD-10-CM

## 2019-07-12 PROCEDURE — 99207 ZZC POST PARTUM EXAM: CPT | Performed by: OBSTETRICS & GYNECOLOGY

## 2019-07-12 PROCEDURE — 36415 COLL VENOUS BLD VENIPUNCTURE: CPT | Performed by: OBSTETRICS & GYNECOLOGY

## 2019-07-12 PROCEDURE — 82306 VITAMIN D 25 HYDROXY: CPT | Performed by: OBSTETRICS & GYNECOLOGY

## 2019-07-12 PROCEDURE — G0476 HPV COMBO ASSAY CA SCREEN: HCPCS | Performed by: OBSTETRICS & GYNECOLOGY

## 2019-07-12 PROCEDURE — G0145 SCR C/V CYTO,THINLAYER,RESCR: HCPCS | Performed by: OBSTETRICS & GYNECOLOGY

## 2019-07-12 RX ORDER — BREAST PUMP
1 EACH MISCELLANEOUS PRN
Qty: 1 EACH | Refills: 1 | Status: SHIPPED | OUTPATIENT
Start: 2019-07-12 | End: 2021-08-16

## 2019-07-12 ASSESSMENT — PATIENT HEALTH QUESTIONNAIRE - PHQ9: SUM OF ALL RESPONSES TO PHQ QUESTIONS 1-9: 2

## 2019-07-12 NOTE — LETTER
July 22, 2019    Ashley Urrutiamirta  [address] [place] [address]    Dear ,  This letter is regarding your recent Pap smear (cervical cancer screening) and Human Papillomavirus (HPV) test.  We are happy to inform you that your Pap smear result is normal. Cervical cancer is closely linked with certain types of HPV. Your results showed no evidence of high-risk HPV.  We recommend you have your next PAP smear and HPV test in 5 years.  You will still need to return to the clinic every year for an annual exam and other preventive tests.  If you have additional questions regarding this result, please call our registered nurse, Angelika at [phone].  Sincerely,    Francisco Pina MD/mi

## 2019-07-12 NOTE — PROGRESS NOTES
SUBJECTIVE:  Ashley Dias,  is here for a postpartum visit.  She had a  on 19 delivering a healthy baby boy,   weighing 8 lbs 13 oz at term.        Last PHQ-9 score on record=   PHQ-9 SCORE 2019   PHQ-9 Total Score 2     No flowsheet data found.    Delivery complications:  No  Breast feeding:  Yes, Plans to continue indefinitely.  Requests breast pump and script provided.  Bladder problems:  No  Bowel problems/hemorrhoids:  No  Episiotomy/laceration/incision healed? Yes: 2nd degree laceration  Vaginal flow:  None  [place]:  No  Contraception: discussed options.  Leaning toward IUD.  To discuss with spouse  Emotional adjustment:  doing well and happy  Back to work:      12 point review of systems negative other than symptoms noted below.    OBJECTIVE:  Vitals: BP 96/64   Wt 74.9 kg (165 lb 1.6 oz)   LMP 2018 (Within Days)   Breastfeeding? Yes   BMI 26.65 kg/m    BMI= Body mass index is 26.65 kg/m .  General - pleasant female in no acute distress.  Breast -  deferred  Abdomen - No incision  Pelvic - EG: normal adult female, BUS: within normal limits, Vagina: well rugated, no discharge, Cervix: no lesions or CMT, Uterus: firm, normal sized and nontender, midplane in position. Adnexae: no masses or tenderness.  Rectovaginal - deferred.    ASSESSMENT:    ICD-10-CM    1. Routine postpartum follow-up Z39.2        PLAN:  May resume normal activities without restrictions.  Pap smear was done today.    Full counseling was provided, and all questions were answered.   Return to clinic in one year for an annual visit.   Contraceptive options discussed.  Considering IUD    Aldo Pina MD

## 2019-07-12 NOTE — NURSING NOTE
"Chief Complaint   Patient presents with     Postpartum Care   baby boy Amir born VD 8#13oz on 19    Initial BP 96/64   Wt 74.9 kg (165 lb 1.6 oz)   LMP 2018 (Within Days)   Breastfeeding? Yes   BMI 26.65 kg/m   Estimated body mass index is 26.65 kg/m  as calculated from the following:    Height as of 19: 1.676 m (5' 6\").    Weight as of this encounter: 74.9 kg (165 lb 1.6 oz).  BP completed using cuff size: regular    Questioned patient about current smoking habits.  Pt. has never smoked.          The following HM Due: pap smear      Marisol Flaherty CMA               "

## 2019-07-15 LAB — DEPRECATED CALCIDIOL+CALCIFEROL SERPL-MC: 20 UG/L (ref 20–75)

## 2019-07-16 LAB
COPATH REPORT: NORMAL
PAP: NORMAL

## 2019-07-18 LAB
FINAL DIAGNOSIS: NORMAL
HPV HR 12 DNA CVX QL NAA+PROBE: NEGATIVE
HPV16 DNA SPEC QL NAA+PROBE: NEGATIVE
HPV18 DNA SPEC QL NAA+PROBE: NEGATIVE
SPECIMEN DESCRIPTION: NORMAL
SPECIMEN SOURCE CVX/VAG CYTO: NORMAL

## 2019-10-11 ENCOUNTER — OFFICE VISIT (OUTPATIENT)
Dept: URGENT CARE | Facility: URGENT CARE | Age: 34
End: 2019-10-11
Payer: COMMERCIAL

## 2019-10-11 VITALS
OXYGEN SATURATION: 100 % | SYSTOLIC BLOOD PRESSURE: 102 MMHG | BODY MASS INDEX: 27.97 KG/M2 | DIASTOLIC BLOOD PRESSURE: 60 MMHG | WEIGHT: 173.3 LBS | TEMPERATURE: 98.2 F | HEART RATE: 64 BPM

## 2019-10-11 DIAGNOSIS — R07.9 ACUTE CHEST PAIN: Primary | ICD-10-CM

## 2019-10-11 DIAGNOSIS — J02.9 SORE THROAT: ICD-10-CM

## 2019-10-11 DIAGNOSIS — R01.1 HEART MURMUR: ICD-10-CM

## 2019-10-11 DIAGNOSIS — R94.31 INVERTED T WAVE: ICD-10-CM

## 2019-10-11 LAB
D DIMER PPP FEU-MCNC: <0.3 UG/ML FEU (ref 0–0.5)
DEPRECATED S PYO AG THROAT QL EIA: NORMAL
SPECIMEN SOURCE: NORMAL
TROPONIN I SERPL-MCNC: <0.015 UG/L (ref 0–0.04)

## 2019-10-11 PROCEDURE — 85379 FIBRIN DEGRADATION QUANT: CPT | Performed by: FAMILY MEDICINE

## 2019-10-11 PROCEDURE — 99203 OFFICE O/P NEW LOW 30 MIN: CPT | Performed by: FAMILY MEDICINE

## 2019-10-11 PROCEDURE — 87880 STREP A ASSAY W/OPTIC: CPT | Performed by: FAMILY MEDICINE

## 2019-10-11 PROCEDURE — 87081 CULTURE SCREEN ONLY: CPT | Performed by: FAMILY MEDICINE

## 2019-10-11 PROCEDURE — 84484 ASSAY OF TROPONIN QUANT: CPT | Performed by: FAMILY MEDICINE

## 2019-10-11 PROCEDURE — 93000 ELECTROCARDIOGRAM COMPLETE: CPT | Performed by: FAMILY MEDICINE

## 2019-10-11 PROCEDURE — 36415 COLL VENOUS BLD VENIPUNCTURE: CPT | Performed by: FAMILY MEDICINE

## 2019-10-11 NOTE — PATIENT INSTRUCTIONS
follow up with a cardiologist for further evaluation of the chest pain and of the inverted T waves in leads V1 and V3      If the Troponin or D-Dimer levels are elevated, go to the emergency room for further evaluation.  I will call you with the results later today.

## 2019-10-11 NOTE — PROGRESS NOTES
SUBJECTIVE:  Ashley Dias is a 33 year old female who presents to the office with the CC of four days of improving sore throat and improving chest pain (at the ears, neck, throat, bilateral upper chest), similar to a sensation of something pushing into the chest/heaviness.  The pain has improved today; however, it is still present but mild at the throat and at the midline lower chest.  Patient has been taking Tylenol over the past four days.  She last took Tylenol last night.       The chest pain is worse with movements such as cleaning the home.  Sleeping improves the pain.      Eating does not worse the pain.     No fevers.  No coughing.  No shortness of breath.       No recent surgeries.  No leg swelling.  No recent long plane/car rides.  Patient does not smoke.  She is not on birth control pills.      Pain is relieved by sleeping, Tylenol.  Cardiac risk factors: negative for abnormal lipids, DM, HTN, tobacco       Past Medical History:   Diagnosis Date     Constipation      Dislocation     R shoulder chronic   She was diagnosed with a heart murmur about three years ago.      Family History:  Coronary artery disease in the father          Current Outpatient Medications:      Prenatal Vit-Fe Fumarate-FA (PRENATAL MULTIVITAMIN  PLUS IRON) 27-0.8 MG TABS, Take 1 tablet by mouth daily, Disp: , Rfl:      Misc. Devices (BREAST PUMP) MISC, 1 each as needed (as needed) (Patient not taking: Reported on 10/11/2019), Disp: 1 each, Rfl: 1    Social History     Tobacco Use     Smoking status: Never Smoker     Smokeless tobacco: Never Used   Substance Use Topics     Alcohol use: No       ROS:CONSTITUTIONAL:NEGATIVE for fever, chills, change in weight  INTEGUMENTARY/SKIN: NEGATIVE for worrisome rashes, moles or lesions  EYES: negative for eye problems.   ENT/MOUTH:  Positive for sore throat.    RESP:NEGATIVE for significant cough or SOB  CV: POSITIVE for chest pain.   GI: negative for abdominal pain/vomiting/diarrhea.    :  negative for problems in the urinary tract.    NEURO: negative for numbness/weakness.      EXAM:  /60   Pulse 64   Temp 98.2  F (36.8  C) (Tympanic)   Wt 78.6 kg (173 lb 4.8 oz)   SpO2 100%   Breastfeeding? Yes   BMI 27.97 kg/m    GENERAL APPEARANCE: healthy, alert and no distress.  No acute respiratory distress.    HENT: ear canals and TM's normal.  Nose and mouth without ulcers, erythema or lesions  NECK: supple, nontender, no lymphadenopathy  RESP: lungs clear to auscultation - no rales, rhonchi or wheezes  CV: grade 2/6 early systolic murmur heard best over the area to the left of the upper sternal border  CHEST WALL:  No pain with palpation over the chest wall.    SKIN: no suspicious lesions or rashes.  No diaphoresis.      Office EKG demonstrates:  appears normal, NSR,normal axis, normal intervals, no acute ST changes c/w ischemia,no LVH by voltage criteria.  There is, however, some inverted T waves at the leads V1 and V3.  No ST depression.  No ST elevation.  No Q waves.      LABS:    Results for orders placed or performed in visit on 10/11/19   Troponin I   Result Value Ref Range    Troponin I ES <0.015 0.000 - 0.045 ug/L   D dimer, quantitative   Result Value Ref Range    D Dimer <0.3 0.0 - 0.50 ug/ml FEU   Rapid strep screen   Result Value Ref Range    Specimen Description Throat     Rapid Strep A Screen       NEGATIVE: No Group A streptococcal antigen detected by immunoassay, await culture report.         Assessment  / IMPRESSION AND PLAN:  Acute Chest pain.  Troponin and.  D-Dimer are both negative, and so far no signs of acute MI nor Pulmonary embolus are likely.  However, I cannot rule out angina as a possible cause of the chest discomfort.    follow up with a cardiologist for further evaluation.  I ordered a cardiology report for the patient.      Sore Throat  Systolic Heart Murmur, possibly suggestive of aortic stenosis, for example.  Throat culture is pending.       T wave inversion. I  am concerned about possible ischemia.  Patient will follow up with a cardiologist for further evaluation.        Shadi Parkinson MD

## 2019-10-12 LAB
BACTERIA SPEC CULT: NORMAL
SPECIMEN SOURCE: NORMAL

## 2019-10-14 ENCOUNTER — HOSPITAL ENCOUNTER (EMERGENCY)
Facility: CLINIC | Age: 34
Discharge: HOME | End: 2019-10-14
Attending: EMERGENCY MEDICINE | Admitting: EMERGENCY MEDICINE
Payer: COMMERCIAL

## 2019-10-14 ENCOUNTER — APPOINTMENT (OUTPATIENT)
Dept: GENERAL RADIOLOGY | Facility: CLINIC | Age: 34
End: 2019-10-14
Attending: EMERGENCY MEDICINE
Payer: COMMERCIAL

## 2019-10-14 VITALS
RESPIRATION RATE: 16 BRPM | HEART RATE: 56 BPM | SYSTOLIC BLOOD PRESSURE: 112 MMHG | TEMPERATURE: 99.2 F | DIASTOLIC BLOOD PRESSURE: 58 MMHG | BODY MASS INDEX: 27.76 KG/M2 | OXYGEN SATURATION: 100 % | WEIGHT: 172 LBS

## 2019-10-14 DIAGNOSIS — M54.2 NECK PAIN: ICD-10-CM

## 2019-10-14 DIAGNOSIS — R07.9 CHEST PAIN, UNSPECIFIED TYPE: ICD-10-CM

## 2019-10-14 DIAGNOSIS — R42 DIZZINESS: ICD-10-CM

## 2019-10-14 LAB
ANION GAP SERPL CALCULATED.3IONS-SCNC: 14 MMOL/L (ref 3–14)
B-HCG FREE SERPL-ACNC: <5 IU/L
BASOPHILS # BLD AUTO: 0 10E9/L (ref 0–0.2)
BASOPHILS NFR BLD AUTO: 0.5 %
BUN SERPL-MCNC: 9 MG/DL (ref 7–30)
CALCIUM SERPL-MCNC: 9.3 MG/DL (ref 8.5–10.1)
CHLORIDE SERPL-SCNC: 106 MMOL/L (ref 94–109)
CO2 SERPL-SCNC: 20 MMOL/L (ref 20–32)
CREAT SERPL-MCNC: 0.58 MG/DL (ref 0.52–1.04)
DIFFERENTIAL METHOD BLD: NORMAL
EOSINOPHIL # BLD AUTO: 0 10E9/L (ref 0–0.7)
EOSINOPHIL NFR BLD AUTO: 0.3 %
ERYTHROCYTE [DISTWIDTH] IN BLOOD BY AUTOMATED COUNT: 14.1 % (ref 10–15)
GFR SERPL CREATININE-BSD FRML MDRD: >90 ML/MIN/{1.73_M2}
GLUCOSE SERPL-MCNC: 93 MG/DL (ref 70–99)
HCT VFR BLD AUTO: 46.1 % (ref 35–47)
HETEROPH AB SER QL: NEGATIVE
HGB BLD-MCNC: 14.9 G/DL (ref 11.7–15.7)
IMM GRANULOCYTES # BLD: 0 10E9/L (ref 0–0.4)
IMM GRANULOCYTES NFR BLD: 0.3 %
LYMPHOCYTES # BLD AUTO: 1.4 10E9/L (ref 0.8–5.3)
LYMPHOCYTES NFR BLD AUTO: 22.3 %
MCH RBC QN AUTO: 29.2 PG (ref 26.5–33)
MCHC RBC AUTO-ENTMCNC: 32.3 G/DL (ref 31.5–36.5)
MCV RBC AUTO: 90 FL (ref 78–100)
MONOCYTES # BLD AUTO: 0.6 10E9/L (ref 0–1.3)
MONOCYTES NFR BLD AUTO: 9.5 %
NEUTROPHILS # BLD AUTO: 4.1 10E9/L (ref 1.6–8.3)
NEUTROPHILS NFR BLD AUTO: 67.1 %
NRBC # BLD AUTO: 0 10*3/UL
NRBC BLD AUTO-RTO: 0 /100
PLATELET # BLD AUTO: 248 10E9/L (ref 150–450)
POTASSIUM SERPL-SCNC: 3.7 MMOL/L (ref 3.4–5.3)
RBC # BLD AUTO: 5.11 10E12/L (ref 3.8–5.2)
SODIUM SERPL-SCNC: 140 MMOL/L (ref 133–144)
TROPONIN I SERPL-MCNC: <0.015 UG/L (ref 0–0.04)
TSH SERPL DL<=0.005 MIU/L-ACNC: 2.4 MU/L (ref 0.4–4)
WBC # BLD AUTO: 6.1 10E9/L (ref 4–11)

## 2019-10-14 PROCEDURE — 96361 HYDRATE IV INFUSION ADD-ON: CPT

## 2019-10-14 PROCEDURE — 85025 COMPLETE CBC W/AUTO DIFF WBC: CPT | Performed by: EMERGENCY MEDICINE

## 2019-10-14 PROCEDURE — 84443 ASSAY THYROID STIM HORMONE: CPT | Performed by: EMERGENCY MEDICINE

## 2019-10-14 PROCEDURE — 84484 ASSAY OF TROPONIN QUANT: CPT | Performed by: EMERGENCY MEDICINE

## 2019-10-14 PROCEDURE — 99285 EMERGENCY DEPT VISIT HI MDM: CPT | Mod: 25

## 2019-10-14 PROCEDURE — 25000132 ZZH RX MED GY IP 250 OP 250 PS 637: Performed by: EMERGENCY MEDICINE

## 2019-10-14 PROCEDURE — 84702 CHORIONIC GONADOTROPIN TEST: CPT

## 2019-10-14 PROCEDURE — 80048 BASIC METABOLIC PNL TOTAL CA: CPT | Performed by: EMERGENCY MEDICINE

## 2019-10-14 PROCEDURE — 93005 ELECTROCARDIOGRAM TRACING: CPT

## 2019-10-14 PROCEDURE — 86308 HETEROPHILE ANTIBODY SCREEN: CPT | Performed by: EMERGENCY MEDICINE

## 2019-10-14 PROCEDURE — 71046 X-RAY EXAM CHEST 2 VIEWS: CPT

## 2019-10-14 PROCEDURE — 96374 THER/PROPH/DIAG INJ IV PUSH: CPT

## 2019-10-14 PROCEDURE — 25000128 H RX IP 250 OP 636: Performed by: EMERGENCY MEDICINE

## 2019-10-14 RX ORDER — ACETAMINOPHEN 500 MG
1000 TABLET ORAL ONCE
Status: COMPLETED | OUTPATIENT
Start: 2019-10-14 | End: 2019-10-14

## 2019-10-14 RX ORDER — KETOROLAC TROMETHAMINE 15 MG/ML
15 INJECTION, SOLUTION INTRAMUSCULAR; INTRAVENOUS ONCE
Status: COMPLETED | OUTPATIENT
Start: 2019-10-14 | End: 2019-10-14

## 2019-10-14 RX ADMIN — KETOROLAC TROMETHAMINE 15 MG: 15 INJECTION, SOLUTION INTRAMUSCULAR; INTRAVENOUS at 12:51

## 2019-10-14 RX ADMIN — ACETAMINOPHEN 1000 MG: 500 TABLET, FILM COATED ORAL at 12:51

## 2019-10-14 RX ADMIN — SODIUM CHLORIDE 1000 ML: 9 INJECTION, SOLUTION INTRAVENOUS at 11:18

## 2019-10-14 ASSESSMENT — ENCOUNTER SYMPTOMS
FEVER: 0
NAUSEA: 1
SHORTNESS OF BREATH: 1
DIZZINESS: 1
ABDOMINAL PAIN: 0
NECK PAIN: 1
COUGH: 0

## 2019-10-14 NOTE — ED TRIAGE NOTES
"Patient reports chest pain for 1 week and dizziness when standing up or walking. She reports she was seen for this on Friday (3 days ago) at [facility] and they did blood work and an EKG. She still has the pain. \"They didn't give me anything for the pain\".   "

## 2019-10-14 NOTE — ED PROVIDER NOTES
History     Chief Complaint:  Chest Pain    HPI   Ashley Dias is a 33 year old female who presents to the emergency department along with her  for chest pain. The patient reports that the pain started a week ago and was seen in at Urgent Care three days ago with normal labs but has not improved since then, prompting her reevaluation. The pain occurs in her chest radiates to her neck and is described by the patient as a constant pressure-like pain. The patient reports that swallowing does not affect the pain but movement causes the pain to worsen. She has tried taking ibuprofen with mildly positive results. The patient reports intermittent dizziness, nausea, and shortness of breath. She denies illness, cough, fever, abdominal pain, swelling in the leg, and heartburn. She denies experiencing these symptoms in the past.She denies taking birth control. She also denies any recent travel or surgery.     CARDIAC RISK FACTORS:  Sex:    F  Tobacco:   No  Hypertension:   No  Hyperlipidemia:  No  Diabetes:   No  Family History:  No    PE/DVT RISK FACTORS:  Sex:    F  Hormones:   No  Tobacco:   No  Cancer:   No  Travel:   No  Surgery:   No  Other immobilization: No  Personal history:  No  Family history:  No    [facility]  Office EKG demonstrates:  appears normal, NSR,normal axis, normal intervals, no acute ST changes c/w ischemia,no LVH by voltage criteria.  There is, however, some inverted T waves at the leads V1 and V3.  No ST depression.  No ST elevation.  No Q waves.      Troponin I: <0.015  D dimer: <0.3  Rapid strep screen: Negative  Beta strep group A culture: Pending    Allergies:  No known drug allergies     Medications:    Prenatal vitamins    Past Medical History:    Right shoulder dislocation  Acne  Varicella  Stress urinary incontinence    Past Surgical History:    History reviewed. No pertinent surgical history.    Family History:    Diabetes    Social History:  Smoking status:  Never  Alcohol use: No  Drug use: No  The patient presents to the emergency department with her significant other.  PCP: [facility], [place]  Marital Status:  Single [1]     Review of Systems   Constitutional: Negative for fever.   Respiratory: Positive for shortness of breath. Negative for cough.    Cardiovascular: Positive for chest pain. Negative for leg swelling.   Gastrointestinal: Positive for nausea. Negative for abdominal pain.   Musculoskeletal: Positive for neck pain.   Neurological: Positive for dizziness.   All other systems reviewed and are negative.      Physical Exam   Patient Vitals for the past 24 hrs:   BP Temp Temp src Pulse Heart Rate Resp SpO2 Weight   10/14/19 1130 118/71 -- -- 73 71 -- 96 % --   10/14/19 1100 133/77 -- -- 71 68 -- 98 % --   10/14/19 1021 (!) 148/97 99.2  F (37.3  C) Temporal 78 78 16 99 % 78 kg (172 lb)     Physical Exam  General: Adult female sitting upright  Eyes: PERRL, Conjunctive within normal limits  ENT: Moist mucous membranes, oropharynx clear.   Neck: No palpable thyromegaly.  CV: Normal S1S2. 2-3/6 harsh systolic murmur. No rub/gallop. Regular rate and rhythm. No JVD.  Resp: Clear to auscultation bilaterally, no wheezes, rales or rhonchi. Normal respiratory effort.  GI: Abdomen is soft, nontender and nondistended. No palpable masses. No rebound or guarding.  MSK: No edema. Nontender. Normal active range of motion. No calf asymmetry.  Skin: Warm and dry. No rashes or lesions or ecchymoses on visible skin.  Neuro: Alert and oriented. Responds appropriately to all questions and commands. No focal findings appreciated. Normal muscle tone.  Psych: Normal mood and affect. Pleasant.      Emergency Department Course   ECG (10:27:20):  Rate 66 bpm. RI interval 154. QRS duration 82. QT/QTc 386/404. P-R-T axes 60 20 28. Normal sinus rhythm. Possible Left atrial enlargement. Borderline ECG. No significant change compared to EKG dated 10/11/19. Interpreted at 1122 by Maria D  Cheyanne Zheng MD.    Imaging:  Radiographic findings were communicated with the patient who voiced understanding of the findings.    Chest XR, PA & LAT  IMPRESSION: No acute airspace disease.  As read by Radiology.    Laboratory:  CBC: AWNL (WBC 6.1, HGB 14.9, )  BMP: AWNL (Creatinine 0.58)  ISTAT HCG: <5.0    Troponin I (1108): <0.015  TSH: 2.40  Mononucleosis screen: Negative    Interventions:  1118 NS 1L IV Bolus  1251 Toradol 15 mg IV  1251 Tylenol 1000 mg PO    Emergency Department Course:  Past medical records, nursing notes, and vitals reviewed.  1103: I performed an exam of the patient and obtained history, as documented above.    IV inserted and blood drawn.    EKG was obtained and reviewed in the emergency department.    The patient was sent for a chest x-ray while in the emergency department, findings above.     1250: I rechecked the patient. Findings and plan explained to the Patient and significant other. Patient discharged home with instructions regarding supportive care, medications, and reasons to return. The importance of close follow-up was reviewed.   Impression & Plan    Medical Decision Making:  Ashley Dias is a 33 year old female with a six month infant currently lactating presents to the emergency department for concerns for chest discomfort into the neck as well as associated dizziness that is intermittent in nature. This chest pain has been constat and she was seen at an outpatient clinic three days ago with normal troponin, d dimer and a negative strep test. She denies any sore throat with swallowing but notes her neck feels uncomfortable but it seems to be radiating up from the chest. This is consistent with referred pain and could be musculoskeletal in origin. Could also be esophagitis or GERD, although this seems less likely. It is worse when she makes movements, concerning for possible costochondritis. A second troponin and EKG done here did not show any changes from initial  screening. X-ray was unremarkable. Various other lab tests done for the dizziness and generalized fatigue and that did not show any worrisome findings. IV fluid infusion seemed to improve her dizziness, although it was not orthostatic in nature given that she was able to stand and ambulate without any dizziness. Although the etiology of her symptoms are not clear, I feel comfortable discharging her home with no apparent life threatening pathology. She has scheduled follow up with cardiology for her known cardiac murmur on Friday. She should still see her primary care provider in 2-3 days. All questions answered prior to discharge.    Diagnosis:    ICD-10-CM   1. Chest pain, unspecified type R07.9   2. Neck pain M54.2   3. Dizziness R42     Disposition:  Discharged to home with instructions for follow up.    Abdirashid Rao  10/14/2019   [facility] EMERGENCY DEPARTMENT  Scribe Disclosure:  I, Abdirashid Rao, am serving as a scribe at 11:03 AM on 10/14/2019 to document services personally performed by Cheyanne Killian MD based on my observations and the provider's statements to me.      Cheyanne Killian MD  10/14/19 7531

## 2019-10-14 NOTE — ED AVS SNAPSHOT
[facility] Emergency Department  [address]  [facility] [address]  Phone:  [phone]  Fax:  [phone]                                    Ashley Dias   MRN: [id number]    Department:  [facility] Emergency Department   Date of Visit:  10/14/2019           After Visit Summary Signature Page    I have received my discharge instructions, and my questions have been answered. I have discussed any challenges I see with this plan with the nurse or doctor.    ..........................................................................................................................................  Patient/Patient Representative Signature      ..........................................................................................................................................  Patient Representative Print Name and Relationship to Patient    ..................................................               ................................................  Date                                   Time    ..........................................................................................................................................  Reviewed by Signature/Title    ...................................................              ..............................................  Date                                               Time          22EPIC Rev 08/18

## 2019-10-14 NOTE — DISCHARGE INSTRUCTIONS
Discharge Instructions  Dizziness (Lightheaded)  Today you were seen for dizziness.  Dizziness can be caused by many things and it can be very difficult to determine the cause of dizziness.  At this time, your provider has found no signs that your dizziness is due to a serious or life-threatening condition. However, sometimes there is a serious problem that does not show up right away, and it is important for you to follow up with your regular provider as instructed.  Generally, every Emergency Department visit should have a follow-up clinic visit with either a primary or a specialty clinic/provider. Please follow-up as instructed by your emergency provider today.      Return to the Emergency Department if:    You pass out (fainting or falling out), especially during exercise.    You develop chest pain, chest pressure or difficulty breathing.  Your feel an irregular heartbeat.  You have excessive vaginal bleeding, or blood in your stool or vomit (throw up).  You have a high fever.  Your symptoms get worse or more frequent.    If when you begin to feel dizzy or lightheaded, it is important to sit down or lay down immediately to prevent injury from falling.  If you were given a prescription for medicine here today, be sure to read all of the information (including the package insert) that comes with your prescription.  This will include important information about the medicine, its side effects, and any warnings that you need to know about.  The pharmacist who fills the prescription can provide more information and answer questions you may have about the medicine.  If you have questions or concerns that the pharmacist cannot address, please call or return to the Emergency Department.   Remember that you can always come back to the Emergency Department if you are not able to see your regular provider in the amount of time listed above, if you get any new symptoms, or if there is anything that worries you.

## 2019-10-15 LAB — INTERPRETATION ECG - MUSE: NORMAL

## 2020-03-04 ENCOUNTER — OFFICE VISIT (OUTPATIENT)
Dept: URGENT CARE | Facility: URGENT CARE | Age: 35
End: 2020-03-04
Payer: COMMERCIAL

## 2020-03-04 VITALS
DIASTOLIC BLOOD PRESSURE: 66 MMHG | HEART RATE: 67 BPM | WEIGHT: 178 LBS | OXYGEN SATURATION: 97 % | BODY MASS INDEX: 28.73 KG/M2 | SYSTOLIC BLOOD PRESSURE: 114 MMHG | TEMPERATURE: 98.5 F

## 2020-03-04 DIAGNOSIS — J98.01 BRONCHOSPASM, ACUTE: Primary | ICD-10-CM

## 2020-03-04 PROCEDURE — 99213 OFFICE O/P EST LOW 20 MIN: CPT | Performed by: PHYSICIAN ASSISTANT

## 2020-03-04 RX ORDER — ALBUTEROL SULFATE 90 UG/1
2 AEROSOL, METERED RESPIRATORY (INHALATION)
Qty: 1 INHALER | Refills: 0 | Status: SHIPPED | OUTPATIENT
Start: 2020-03-04 | End: 2021-09-10

## 2020-03-04 NOTE — PATIENT INSTRUCTIONS
With distilled water 2-3x per day for a minimum 2-3 days  Mucinex, increased fluids, humidifier at night, steaming in the day  Cough drops and hot saltwater gargles as needed    Adult Self-Care for Colds  Colds are caused by viruses. They can't be cured with antibiotics. However, you can ease symptoms and support your body's efforts to heal itself.  No matter which symptoms you have, be sure to:    Drink plenty of fluids (water or clear soup)    Stop smoking and drinking alcohol    Get plenty of rest    Understand a fever    Take your temperature several times a day. If your fever is 100.4 F (38.0 C) for more than a day, call your healthcare provider.    Relax, lie down. Go to bed if you want. Just get off your feet and rest. Also, drink plenty of fluids to avoid dehydration.    Take acetaminophen or a nonsteroidal anti-inflammatory agent (NSAID), such as ibuprofen.  Treat a troubled nose kindly    Breathe steam or heated humidified air to open blocked nasal passages.  a hot shower or use a vaporizer. Be careful not to get burned by the steam.    Saline nasal sprays and decongestant tablets help open a stuffy nose. Antihistamines can also help, but they can cause side effects such as drowsiness and drying of the eyes, nose, and mouth.  Soothe a sore throat and cough    Gargle every 2 hours with 1/4 teaspoon of salt dissolved in 1/2 cup of warm water. Suck on throat lozenges and cough drops to moisten your throat.    Cough medicines are available but it is unclear how well they actually work.    Take acetaminophen or an NSAID, such as ibuprofen, to ease throat pain  Ease digestive problems    Put fluids back into your body. Take frequent sips of clear liquids such as water or broth. Avoid drinks that have a lot of sugar in them, such as juices and sodas. These can make diarrhea worse. Older children and adults can drink sports drinks.    As your appetite returns, you can resume your normal diet. Ask your  healthcare provider if there are any foods you should avoid.  When to seek medical care  When you first notice symptoms, ask your healthcare provider if antiviral medicines are appropriate. Antibiotics should not be taken for colds or flu. Also, call your healthcare provider if you have any of the following symptoms or if you aren't feeling better after 7 days:    Shortness of breath    Pain or pressure in the chest or belly (abdomen)    Worsening symptoms, especially after a period of improvement    Fever of 100.4 F  (38.0 C) or higher, or fever that doesn't go down with medicine    Sudden dizziness or confusion    Severe or continued vomiting    Signs of dehydration, including extreme thirst, dark urine, infrequent urination, dry mouth    Spotted, red, or very sore throat   Date Last Reviewed: 12/1/2016 2000-2017 [employer]. [address] [place] [address]. All rights reserved. This information is not intended as a substitute for professional medical care. Always follow your healthcare professional's instructions.    Patient Education     Bronchospasm (Adult)    Bronchospasm occurs when the airways (bronchial tubes) go into spasm and contract. This makes it hard to breathe and causes wheezing (a high-pitched whistling sound). Bronchospasm can also cause frequent coughing without wheezing.  Bronchospasm is due to irritation, inflammation, or allergic reaction of the airways. People with asthma get bronchospasm. However, not everyone with bronchospasm has asthma.  Being exposed to harmful fumes, a recent case of bronchitis, exercise, or a flare-up of chronic obstructive pulmonary disease (COPD) may cause the airways to spasm. An episode of bronchospasm may last 7 to 14 days. Medicine may be prescribed to relax the airways and prevent wheezing. Antibiotics will be prescribed only if your healthcare provider thinks there is a bacterial infection. Antibiotics do not help a viral  infection.  Home care    Drink lots of water or other fluids (at least 10 glasses a day) during an attack. This will loosen lung secretions and make it easier to breathe. If you have heart or kidney disease, check with your doctor before you drink extra fluids.    Take prescribed medicine exactly at the times advised. If you take an inhaled medicine to help with breathing, don't use it more than once every 4 hours, unless told to do so. If prescribed an antibiotic or prednisone, take all of the medicine, even if you are feeling better after a few days.    Don't smoke. Also avoid being exposed to secondhand smoke.    If you were given an inhaler, use it exactly as directed. If you need to use it more often than prescribed, your condition may be getting worse. Contact your healthcare provider.  Follow-up care  Follow up with your healthcare provider, or as advised.  If you are age 65 or older, have a chronic lung disease or condition that affects your immune system, or you smoke, ask your healthcare provider about getting a pneumococcal vaccine, as well as a yearly flu shot (influenza vaccine).  When to seek medical advice  Call your healthcare provider right away if any of these occur:    You need to use your inhalers more often than usual    Fever of 100.4 F (38 C) or higher, or as directed by your healthcare provider    Cough that brings up lots of dark-colored sputum (mucus)    You don't get better within 24 hours  Call 911  Call 911 if any of these occur:    Coughing up bloody sputum (mucus)    Chest pain with each breath    Increased wheezing or shortness of breath  Date Last Reviewed: 6/1/2018 2000-2019 [employer]. [address] [place] [address], [place] [address]. All rights reserved. This information is not intended as a substitute for professional medical care. Always follow your healthcare professional's instructions.           Patient Education     Viral Upper Respiratory Illness with Wheezing  (Adult)    You have a viral upper respiratory illness (URI), which is another term for the common cold. When the infection causes a lot of irritation, the air passages can go into spasm. This causes wheezing and shortness of breath.  This illness is contagious during the first few days. It is spread through the air by coughing and sneezing. It may also be spread by direct contact. This could be by touching the sick person and then touching your own eyes, nose, or mouth. Frequent handwashing will decrease the risk.  Most viral illnesses go away within 7 to 10 days with rest and simple home remedies. Sometimes the illness may last for several weeks. Antibiotics will not kill a virus, and they are generally not prescribed for this condition.  Home care    If symptoms are severe, rest at home for the first 2 to 3 days. When you resume activity, don't let yourself get too tired.    If you smoke, stop. Ask your healthcare provider if you need help.    Stay away from secondhand cigarette smoke. Don't let people smoke in your house or car.    You may use acetaminophen or ibuprofen to control pain and fever, unless another medicine was prescribed. Take the medicine only as directed on the label. If you have chronic liver or kidney disease, have ever had a stomach ulcer or gastrointestinal bleeding, or are taking blood-thinning medicines, talk with your healthcare provider before using these medicines. Aspirin should never be given to anyone under 18 years of age who is ill with a viral infection or fever. It may cause severe liver or brain damage.    Your appetite may be poor, so a light diet is fine. Stay well hydrated by drinking 6 to 8 glasses of fluids per day (water, soft drinks, juices, tea, or soup). Extra fluids will help loosen secretions in the nose and lungs.    Over-the-counter cold medicines will not shorten the length of time you re sick, but they may be helpful for the following symptoms: cough, sore throat,  and nasal and sinus congestion. Ask your healthcare provider or pharmacist which over-the-counter medicine to use. Don't use decongestants if you have high blood pressure.  Follow-up care  Follow up with your healthcare provider, or as advised.  When to seek medical advice  Call your healthcare provider right away if any of these occur:    Cough with lots of colored sputum (mucus)    Severe headache; face, neck, or ear pain    Difficulty swallowing due to throat pain    Fever of 100.4 F (38 C) or higher, or as directed by your healthcare provider  Call 911  Call 911 if any of these occur:    Chest pain, shortness of breath, worsening wheezing, or difficulty breathing    Coughing up blood    Very severe pain when swallowing, especially if it goes along with a muffled voice  Date Last Reviewed: 6/1/2018 2000-2019 [employer]. [address] [place] [address], [place] [address]. All rights reserved. This information is not intended as a substitute for professional medical care. Always follow your healthcare professional's instructions.

## 2020-03-04 NOTE — PROGRESS NOTES
SUBJECTIVE:  Ashley Dias is a 34 year old female who presents to the clinic today with a chief complaint of cough  for 1 week(s).  Her cough is described as productive of yellow sputum.    The patient's symptoms are moderate and stable.  Associated symptoms include congestion, nasal congestion and rhinorrhea. The patient's symptoms are exacerbated by lying down  Patient has been using tylenol and ibuprofen  to improve symptoms.    Past Medical History:   Diagnosis Date     Constipation      Dislocation     R shoulder chronic       Current Outpatient Medications   Medication Sig Dispense Refill     Misc. Devices (BREAST PUMP) MISC 1 each as needed (as needed) (Patient not taking: Reported on 10/11/2019) 1 each 1     Prenatal Vit-Fe Fumarate-FA (PRENATAL MULTIVITAMIN  PLUS IRON) 27-0.8 MG TABS Take 1 tablet by mouth daily         Social History     Tobacco Use     Smoking status: Never Smoker     Smokeless tobacco: Never Used   Substance Use Topics     Alcohol use: No       ROS  10 point ROS negative except as listed above      OBJECTIVE:  /66   Pulse 67   Temp 98.5  F (36.9  C) (Tympanic)   Wt 80.7 kg (178 lb)   SpO2 97%   BMI 28.73 kg/m    GENERAL APPEARANCE: healthy, alert and no distress  EYES: EOMI,  PERRL, conjunctiva clear  HENT: ear canals and TM's normal.  Nose and mouth without ulcers, erythema or lesions  NECK: supple, nontender, no lymphadenopathy  RESP: lungs clear to auscultation - no rales, rhonchi or wheezes  CV: regular rates and rhythm, normal S1 S2, no murmur noted  ABDOMEN:  soft, nontender, no HSM or masses and bowel sounds normal  NEURO: Normal strength and tone, sensory exam grossly normal,  normal speech and mentation  SKIN: no suspicious lesions or rashes    ASSESSMENT:    (J98.01) Bronchospasm, acute  (primary encounter diagnosis)  Comment: No evidence of pneumonia.  Plan: albuterol (PROAIR HFA/PROVENTIL HFA/VENTOLIN         HFA) 108 (90 Base) MCG/ACT inhaler      Red flags and  emergent follow up discussed, and understood by patient  Follow up with PCP if symptoms worsen or fail to improve      Patient Instructions         With distilled water 2-3x per day for a minimum 2-3 days  Mucinex, increased fluids, humidifier at night, steaming in the day  Cough drops and hot saltwater gargles as needed    Adult Self-Care for Colds  Colds are caused by viruses. They can't be cured with antibiotics. However, you can ease symptoms and support your body's efforts to heal itself.  No matter which symptoms you have, be sure to:    Drink plenty of fluids (water or clear soup)    Stop smoking and drinking alcohol    Get plenty of rest    Understand a fever    Take your temperature several times a day. If your fever is 100.4 F (38.0 C) for more than a day, call your healthcare provider.    Relax, lie down. Go to bed if you want. Just get off your feet and rest. Also, drink plenty of fluids to avoid dehydration.    Take acetaminophen or a nonsteroidal anti-inflammatory agent (NSAID), such as ibuprofen.  Treat a troubled nose kindly    Breathe steam or heated humidified air to open blocked nasal passages.  a hot shower or use a vaporizer. Be careful not to get burned by the steam.    Saline nasal sprays and decongestant tablets help open a stuffy nose. Antihistamines can also help, but they can cause side effects such as drowsiness and drying of the eyes, nose, and mouth.  Soothe a sore throat and cough    Gargle every 2 hours with 1/4 teaspoon of salt dissolved in 1/2 cup of warm water. Suck on throat lozenges and cough drops to moisten your throat.    Cough medicines are available but it is unclear how well they actually work.    Take acetaminophen or an NSAID, such as ibuprofen, to ease throat pain  Ease digestive problems    Put fluids back into your body. Take frequent sips of clear liquids such as water or broth. Avoid drinks that have a lot of sugar in them, such as juices and sodas. These can  make diarrhea worse. Older children and adults can drink sports drinks.    As your appetite returns, you can resume your normal diet. Ask your healthcare provider if there are any foods you should avoid.  When to seek medical care  When you first notice symptoms, ask your healthcare provider if antiviral medicines are appropriate. Antibiotics should not be taken for colds or flu. Also, call your healthcare provider if you have any of the following symptoms or if you aren't feeling better after 7 days:    Shortness of breath    Pain or pressure in the chest or belly (abdomen)    Worsening symptoms, especially after a period of improvement    Fever of 100.4 F  (38.0 C) or higher, or fever that doesn't go down with medicine    Sudden dizziness or confusion    Severe or continued vomiting    Signs of dehydration, including extreme thirst, dark urine, infrequent urination, dry mouth    Spotted, red, or very sore throat   Date Last Reviewed: 12/1/2016 2000-2017 [employer]. [address] [place] [address]. All rights reserved. This information is not intended as a substitute for professional medical care. Always follow your healthcare professional's instructions.    Patient Education     Bronchospasm (Adult)    Bronchospasm occurs when the airways (bronchial tubes) go into spasm and contract. This makes it hard to breathe and causes wheezing (a high-pitched whistling sound). Bronchospasm can also cause frequent coughing without wheezing.  Bronchospasm is due to irritation, inflammation, or allergic reaction of the airways. People with asthma get bronchospasm. However, not everyone with bronchospasm has asthma.  Being exposed to harmful fumes, a recent case of bronchitis, exercise, or a flare-up of chronic obstructive pulmonary disease (COPD) may cause the airways to spasm. An episode of bronchospasm may last 7 to 14 days. Medicine may be prescribed to relax the airways and prevent wheezing.  Antibiotics will be prescribed only if your healthcare provider thinks there is a bacterial infection. Antibiotics do not help a viral infection.  Home care    Drink lots of water or other fluids (at least 10 glasses a day) during an attack. This will loosen lung secretions and make it easier to breathe. If you have heart or kidney disease, check with your doctor before you drink extra fluids.    Take prescribed medicine exactly at the times advised. If you take an inhaled medicine to help with breathing, don't use it more than once every 4 hours, unless told to do so. If prescribed an antibiotic or prednisone, take all of the medicine, even if you are feeling better after a few days.    Don't smoke. Also avoid being exposed to secondhand smoke.    If you were given an inhaler, use it exactly as directed. If you need to use it more often than prescribed, your condition may be getting worse. Contact your healthcare provider.  Follow-up care  Follow up with your healthcare provider, or as advised.  If you are age 65 or older, have a chronic lung disease or condition that affects your immune system, or you smoke, ask your healthcare provider about getting a pneumococcal vaccine, as well as a yearly flu shot (influenza vaccine).  When to seek medical advice  Call your healthcare provider right away if any of these occur:    You need to use your inhalers more often than usual    Fever of 100.4 F (38 C) or higher, or as directed by your healthcare provider    Cough that brings up lots of dark-colored sputum (mucus)    You don't get better within 24 hours  Call 911  Call 911 if any of these occur:    Coughing up bloody sputum (mucus)    Chest pain with each breath    Increased wheezing or shortness of breath  Date Last Reviewed: 6/1/2018 2000-2019 [employer]. [address] [place] [address], [place] [address]. All rights reserved. This information is not intended as a substitute for professional medical care.  Always follow your healthcare professional's instructions.           Patient Education     Viral Upper Respiratory Illness with Wheezing (Adult)    You have a viral upper respiratory illness (URI), which is another term for the common cold. When the infection causes a lot of irritation, the air passages can go into spasm. This causes wheezing and shortness of breath.  This illness is contagious during the first few days. It is spread through the air by coughing and sneezing. It may also be spread by direct contact. This could be by touching the sick person and then touching your own eyes, nose, or mouth. Frequent handwashing will decrease the risk.  Most viral illnesses go away within 7 to 10 days with rest and simple home remedies. Sometimes the illness may last for several weeks. Antibiotics will not kill a virus, and they are generally not prescribed for this condition.  Home care    If symptoms are severe, rest at home for the first 2 to 3 days. When you resume activity, don't let yourself get too tired.    If you smoke, stop. Ask your healthcare provider if you need help.    Stay away from secondhand cigarette smoke. Don't let people smoke in your house or car.    You may use acetaminophen or ibuprofen to control pain and fever, unless another medicine was prescribed. Take the medicine only as directed on the label. If you have chronic liver or kidney disease, have ever had a stomach ulcer or gastrointestinal bleeding, or are taking blood-thinning medicines, talk with your healthcare provider before using these medicines. Aspirin should never be given to anyone under 18 years of age who is ill with a viral infection or fever. It may cause severe liver or brain damage.    Your appetite may be poor, so a light diet is fine. Stay well hydrated by drinking 6 to 8 glasses of fluids per day (water, soft drinks, juices, tea, or soup). Extra fluids will help loosen secretions in the nose and lungs.    Over-the-counter  cold medicines will not shorten the length of time you re sick, but they may be helpful for the following symptoms: cough, sore throat, and nasal and sinus congestion. Ask your healthcare provider or pharmacist which over-the-counter medicine to use. Don't use decongestants if you have high blood pressure.  Follow-up care  Follow up with your healthcare provider, or as advised.  When to seek medical advice  Call your healthcare provider right away if any of these occur:    Cough with lots of colored sputum (mucus)    Severe headache; face, neck, or ear pain    Difficulty swallowing due to throat pain    Fever of 100.4 F (38 C) or higher, or as directed by your healthcare provider  Call 911  Call 911 if any of these occur:    Chest pain, shortness of breath, worsening wheezing, or difficulty breathing    Coughing up blood    Very severe pain when swallowing, especially if it goes along with a muffled voice  Date Last Reviewed: 6/1/2018 2000-2019 [employer]. [address] [place] [address], [place] [address]. All rights reserved. This information is not intended as a substitute for professional medical care. Always follow your healthcare professional's instructions.

## 2021-08-16 ENCOUNTER — PRENATAL OFFICE VISIT (OUTPATIENT)
Dept: NURSING | Facility: CLINIC | Age: 36
End: 2021-08-16
Payer: COMMERCIAL

## 2021-08-16 DIAGNOSIS — Z34.90 SUPERVISION OF NORMAL PREGNANCY: Primary | ICD-10-CM

## 2021-08-16 PROCEDURE — 99207 PR NO CHARGE NURSE ONLY: CPT

## 2021-08-16 NOTE — PROGRESS NOTES
NPN nurse visit done over the phone. Pt will be given NPN folder and book at her upcoming appt.   Discussed optional screening available to assess chromosomal anomalies. Questions answered. Pt advised to call the clinic if she has any questions or concerns related to her pregnancy. Prenatal labs will be obtained at her upcoming appt. New prenatal visit scheduled on 9/3/21 with Dr Pina.    9w4d    Lab Results   Component Value Date    PAP NIL 07/12/2019           Patient supplied answers from flow sheet for:  Prenatal OB Questionnaire.  Past Medical History  Have you ever recieved care for your mental health? : No  Have you ever been in a major accident or suffered serious trauma?: No  Within the last year, has anyone hit, slapped, kicked or otherwise hurt you?: No  In the last year, has anyone forced you to have sex when you didn't want to?: No    Past Medical History 2   Have you ever received a blood transfusion?: No  Would you accept a blood transfusion if was medically recommended?: Yes  Does anyone in your home smoke?: No   Is your blood type Rh negative?: No  Have you ever ?: (!) Yes  Have you been hospitalized for a nonsurgical reason excluding normal delivery?: No  Have you ever had an abnormal pap smear?: No    Past Medical History (Continued)  Do you have a history of abnormalities of the uterus?: No  Did your mother take SORIN or any other hormones when she was pregnant with you?: Unknown  Do you have any other problems we have not asked about which you feel may be important to this pregnancy?: No       Kamala Salazar RN

## 2021-09-07 ENCOUNTER — ANCILLARY PROCEDURE (OUTPATIENT)
Dept: ULTRASOUND IMAGING | Facility: CLINIC | Age: 36
End: 2021-09-07
Attending: OBSTETRICS & GYNECOLOGY
Payer: COMMERCIAL

## 2021-09-07 DIAGNOSIS — Z34.90 SUPERVISION OF NORMAL PREGNANCY: ICD-10-CM

## 2021-09-07 PROCEDURE — 76801 OB US < 14 WKS SINGLE FETUS: CPT | Performed by: OBSTETRICS & GYNECOLOGY

## 2021-09-10 ENCOUNTER — PRENATAL OFFICE VISIT (OUTPATIENT)
Dept: OBGYN | Facility: CLINIC | Age: 36
End: 2021-09-10
Payer: COMMERCIAL

## 2021-09-10 VITALS — BODY MASS INDEX: 26.2 KG/M2 | WEIGHT: 162.3 LBS | SYSTOLIC BLOOD PRESSURE: 102 MMHG | DIASTOLIC BLOOD PRESSURE: 66 MMHG

## 2021-09-10 DIAGNOSIS — Z34.82 ENCOUNTER FOR SUPERVISION OF OTHER NORMAL PREGNANCY IN SECOND TRIMESTER: ICD-10-CM

## 2021-09-10 LAB
ABO/RH(D): NORMAL
ALBUMIN UR-MCNC: NEGATIVE MG/DL
ANTIBODY SCREEN: NEGATIVE
APPEARANCE UR: CLEAR
BILIRUB UR QL STRIP: NEGATIVE
COLOR UR AUTO: YELLOW
ERYTHROCYTE [DISTWIDTH] IN BLOOD BY AUTOMATED COUNT: 13.9 % (ref 10–15)
GLUCOSE UR STRIP-MCNC: NEGATIVE MG/DL
HCT VFR BLD AUTO: 35.2 % (ref 35–47)
HGB BLD-MCNC: 11.8 G/DL (ref 11.7–15.7)
HGB UR QL STRIP: ABNORMAL
KETONES UR STRIP-MCNC: NEGATIVE MG/DL
LEUKOCYTE ESTERASE UR QL STRIP: NEGATIVE
MCH RBC QN AUTO: 30 PG (ref 26.5–33)
MCHC RBC AUTO-ENTMCNC: 33.5 G/DL (ref 31.5–36.5)
MCV RBC AUTO: 90 FL (ref 78–100)
NITRATE UR QL: NEGATIVE
PH UR STRIP: 7 [PH] (ref 5–7)
PLATELET # BLD AUTO: 193 10E3/UL (ref 150–450)
RBC # BLD AUTO: 3.93 10E6/UL (ref 3.8–5.2)
RBC #/AREA URNS AUTO: ABNORMAL /HPF
SP GR UR STRIP: 1.02 (ref 1–1.03)
SPECIMEN EXPIRATION DATE: NORMAL
SQUAMOUS #/AREA URNS AUTO: ABNORMAL /LPF
UROBILINOGEN UR STRIP-ACNC: 0.2 E.U./DL
WBC # BLD AUTO: 5.2 10E3/UL (ref 4–11)
WBC #/AREA URNS AUTO: ABNORMAL /HPF

## 2021-09-10 PROCEDURE — 87491 CHLMYD TRACH DNA AMP PROBE: CPT | Performed by: OBSTETRICS & GYNECOLOGY

## 2021-09-10 PROCEDURE — 86762 RUBELLA ANTIBODY: CPT | Performed by: OBSTETRICS & GYNECOLOGY

## 2021-09-10 PROCEDURE — 87340 HEPATITIS B SURFACE AG IA: CPT | Performed by: OBSTETRICS & GYNECOLOGY

## 2021-09-10 PROCEDURE — 87389 HIV-1 AG W/HIV-1&-2 AB AG IA: CPT | Performed by: OBSTETRICS & GYNECOLOGY

## 2021-09-10 PROCEDURE — 86850 RBC ANTIBODY SCREEN: CPT | Performed by: OBSTETRICS & GYNECOLOGY

## 2021-09-10 PROCEDURE — 86900 BLOOD TYPING SEROLOGIC ABO: CPT | Performed by: OBSTETRICS & GYNECOLOGY

## 2021-09-10 PROCEDURE — 99207 PR FIRST OB VISIT: CPT | Performed by: OBSTETRICS & GYNECOLOGY

## 2021-09-10 PROCEDURE — 81001 URINALYSIS AUTO W/SCOPE: CPT | Performed by: OBSTETRICS & GYNECOLOGY

## 2021-09-10 PROCEDURE — 86780 TREPONEMA PALLIDUM: CPT | Performed by: OBSTETRICS & GYNECOLOGY

## 2021-09-10 PROCEDURE — 36415 COLL VENOUS BLD VENIPUNCTURE: CPT | Performed by: OBSTETRICS & GYNECOLOGY

## 2021-09-10 PROCEDURE — 85027 COMPLETE CBC AUTOMATED: CPT | Performed by: OBSTETRICS & GYNECOLOGY

## 2021-09-10 PROCEDURE — 86803 HEPATITIS C AB TEST: CPT | Performed by: OBSTETRICS & GYNECOLOGY

## 2021-09-10 PROCEDURE — 86901 BLOOD TYPING SEROLOGIC RH(D): CPT | Performed by: OBSTETRICS & GYNECOLOGY

## 2021-09-10 PROCEDURE — 87591 N.GONORRHOEAE DNA AMP PROB: CPT | Performed by: OBSTETRICS & GYNECOLOGY

## 2021-09-10 NOTE — LETTER
September 10, 2021      Re: Ashley Dias  [address]  [place] [address]        To Whom It May Concern,        Ashley Aguila is currently pregnant 13w1d. KATERIN 3/10/21.  Will need to reduce her heavy work load during her pregnancy.     Please take this recommendation to have a modified work load during her pregnancy with no heavy lifting or strenuous activity.             Sincerely,        Francisco Pina MD

## 2021-09-10 NOTE — PROGRESS NOTES
New OB Visit  Ashley Dias   September 10, 2021   14w1d     Subjective: Ashley Dias 35 year old  at 14w1d dated by midtrimester ultrasound here today for initial OB visit. Patient reports No Problems. Denies cramping and vaginal spotting.     Gyn History:   Menses: LMP: Patient's last menstrual period was 06/10/2021 (approximate). frequency: q month  Sexually transmitted disease history: none.    Occupation: Works (CNA) in Nursing [employer]  Exercise: active with 4 children   Diet: as tolerated  H/o Chicken Pox or Varicella Vaccination: Yes    History of GDM: No   Hx PTL : No   History of HTN in pregnancy: No   Shoulder dystocia: No   Vacuum Extraction: No   PPH: No   3rd of 4th degree laceration: No   Other complications: No    Since her last LMP she denies use of alcohol, tobacco and street drugs.    OBhx:  x 4  OB History    Para Term  AB Living   5 4 4 0 0 4   SAB TAB Ectopic Multiple Live Births   0 0 0 0 4      # Outcome Date GA Lbr Abdirahman/2nd Weight Sex Delivery Anes PTL Lv   5 Current            4 Term 19 41w3d 03:04 / 00:14 4.01 kg (8 lb 13.5 oz) M Vag-Spont IV N JALEESA      Name: Amir      Apgar1: 7  Apgar5: 9   3 Term 16 39w6d / 00:03 3.79 kg (8 lb 5.7 oz) F Vag-Spont Local, IV  JALEESA      Name: ARNAUD DIAS      Apgar1: 9  Apgar5: 9   2 Term 14 40w1d 06:13 / 00:08 3.87 kg (8 lb 8.5 oz) M Vag-Spont Local  JALEESA      Apgar1: 8  Apgar5: 9   1 Term 13 40w0d  2.722 kg (6 lb) F Vag-Spont EPI  JALEESA       ROS: Ten point review of systems was reviewed and negative except the above.    HISTORY:  Past Medical History:   Diagnosis Date     Constipation      Dislocation     R shoulder chronic     History reviewed. No pertinent surgical history.  Family History   Problem Relation Age of Onset     Hypertension Mother      Diabetes Mother      Social History     Socioeconomic History     Marital status: Single     Spouse name: Carolin     Number of children: 4     Years  of education: None     Highest education level: None   Occupational History     Occupation: CNA     Employer: [employer]   Tobacco Use     Smoking status: Never Smoker     Smokeless tobacco: Never Used   Vaping Use     Vaping Use: Never used   Substance and Sexual Activity     Alcohol use: No     Drug use: No     Sexual activity: Yes     Partners: Male     Comment: Pregnant   Other Topics Concern     None   Social History Narrative     None     Social Determinants of Health     Financial Resource Strain:      Difficulty of Paying Living Expenses:    Food Insecurity:      Worried About Running Out of Food in the Last Year:      Ran Out of Food in the Last Year:    Transportation Needs:      Lack of Transportation (Medical):      Lack of Transportation (Non-Medical):    Physical Activity:      Days of Exercise per Week:      Minutes of Exercise per Session:    Stress:      Feeling of Stress :    Social Connections:      Frequency of Communication with Friends and Family:      Frequency of Social Gatherings with Friends and Family:      Attends Temple Services:      Active Member of Clubs or Organizations:      Attends Club or Organization Meetings:      Marital Status:    Intimate Partner Violence:      Fear of Current or Ex-Partner:      Emotionally Abused:      Physically Abused:      Sexually Abused:      Current Outpatient Medications   Medication Sig     Prenatal Vit-Fe Fumarate-FA (PRENATAL MULTIVITAMIN  PLUS IRON) 27-0.8 MG TABS Take 1 tablet by mouth daily     No current facility-administered medications for this visit.     No Known Allergies    Past medical, surgical, social and family history were reviewed and updated in EPIC.      EXAM:  /66   Wt 73.6 kg (162 lb 4.8 oz)   LMP 06/10/2021 (Approximate)   BMI 26.20 kg/m       Gen:  no acute distress, comfortable  HENT: No scleral injection or icterus  CV: Regular rate and rhythm, no murmur  Resp: CTAB, Normal work of breathing, no  cough  GI: Abdomen soft, non-tender. No masses, organomegaly  Skin: No suspicious lesions or rashes  Psychiatric: mentation appears normal and affect bright   Pelvis: External genitalia within normal limits. Urethra is without lesion. Bladder is nontender.    On speculum exam, cervix is without lesion and vagina is normal without lesion or discharge. Pap smear not obtained due to being UTD  +    NOB labs drawn today    ASSESSMENT:  35 year old  at 14w1d dated by 13 week U/S here for NOB visit.    PLAN:    1)Prenatal labs reviewed. She has no questions.  2) EDUCATION : RECOMMENDED WEIGHT GAIN: 25-35 lbs given Body mass index is 26.2 kg/m ..   - Instructed on best evidence for: healthy diet and foods to avoid; exercise and activity during pregnancy; and maintenance of a generally healthy lifestyle. Reviewed early pregnancy education, provider coverage, labor and delivery, and prenatal visits.  Discussed the harms, benefits, side effects and alternative therapies for current prescribed and OTC medications.  - recommend PNV  3) Discussed options for screening for chromosomal anomalies, including first screen, noninvasive prenatal testing, CVS/amniocentesis, quad screen, and ultrasound at 18-20 weeks. She is electing ultrasound at 18-20 weeks.  4) NOB labs today    Follow up in 4 weeks. She is encouraged to call sooner with questions or concerns.    Francisco Pina MD   Obstetrics and Gynecology

## 2021-09-11 LAB — T PALLIDUM AB SER QL: NONREACTIVE

## 2021-09-12 LAB
C TRACH DNA SPEC QL NAA+PROBE: NEGATIVE
N GONORRHOEA DNA SPEC QL NAA+PROBE: NEGATIVE

## 2021-09-13 LAB
HBV SURFACE AG SERPL QL IA: NONREACTIVE
HCV AB SERPL QL IA: NONREACTIVE
HIV 1+2 AB+HIV1 P24 AG SERPL QL IA: NONREACTIVE
RUBV IGG SERPL QL IA: 4.81 INDEX
RUBV IGG SERPL QL IA: POSITIVE

## 2021-11-16 ENCOUNTER — PRENATAL OFFICE VISIT (OUTPATIENT)
Dept: OBGYN | Facility: CLINIC | Age: 36
End: 2021-11-16
Payer: COMMERCIAL

## 2021-11-16 VITALS — WEIGHT: 176 LBS | SYSTOLIC BLOOD PRESSURE: 102 MMHG | BODY MASS INDEX: 28.41 KG/M2 | DIASTOLIC BLOOD PRESSURE: 60 MMHG

## 2021-11-16 DIAGNOSIS — O09.32 INSUFFICIENT PRENATAL CARE IN SECOND TRIMESTER: Primary | ICD-10-CM

## 2021-11-16 PROBLEM — O09.30 INSUFFICIENT PRENATAL CARE: Status: ACTIVE | Noted: 2021-11-16

## 2021-11-16 PROCEDURE — 99207 PR COMPLICATED OB VISIT: CPT | Performed by: OBSTETRICS & GYNECOLOGY

## 2021-11-16 NOTE — PROGRESS NOTES
No c/o's.  Missed last OB visit and never got U/S scheduled.  Declined flu and COVID vaccines.  Due to being almost 24 weeks, with scant PNC to date, will get Lvl 2 U/S.  28 wk labs, TDaP at next visit.   labor/Premature rupture of membranes precautions reviewed. RTC 4 weeks.    Encounter Diagnosis   Name Primary?     Insufficient prenatal care in second trimester Yes       Risk factors listed above are stable and being addressed as noted.    Bonilla Lemon MD  [facility]

## 2021-11-16 NOTE — NURSING NOTE
"Chief Complaint   Patient presents with     Prenatal Care     23 weeks 5 days- concerns of no ultrasound        Initial /60 (BP Location: Right arm, Patient Position: Sitting, Cuff Size: Adult Regular)   Wt 79.8 kg (176 lb)   LMP 06/10/2021 (Approximate)   BMI 28.41 kg/m   Estimated body mass index is 28.41 kg/m  as calculated from the following:    Height as of 19: 1.676 m (5' 6\").    Weight as of this encounter: 79.8 kg (176 lb).  BP completed using cuff size: regular    Questioned patient about current smoking habits.  Pt. has never smoked.          The following HM Due: NONE    23w5d  Aamir Workman CMA                "

## 2021-11-17 ENCOUNTER — TRANSCRIBE ORDERS (OUTPATIENT)
Dept: MATERNAL FETAL MEDICINE | Facility: CLINIC | Age: 36
End: 2021-11-17
Payer: COMMERCIAL

## 2021-11-17 DIAGNOSIS — O26.90 PREGNANCY RELATED CONDITION, ANTEPARTUM: Primary | ICD-10-CM

## 2021-11-19 ENCOUNTER — PRE VISIT (OUTPATIENT)
Dept: MATERNAL FETAL MEDICINE | Facility: CLINIC | Age: 36
End: 2021-11-19
Payer: COMMERCIAL

## 2021-11-24 ENCOUNTER — HOSPITAL ENCOUNTER (OUTPATIENT)
Dept: ULTRASOUND IMAGING | Facility: CLINIC | Age: 36
End: 2021-11-24
Attending: OBSTETRICS & GYNECOLOGY
Payer: COMMERCIAL

## 2021-11-24 ENCOUNTER — OFFICE VISIT (OUTPATIENT)
Dept: MATERNAL FETAL MEDICINE | Facility: CLINIC | Age: 36
End: 2021-11-24
Attending: OBSTETRICS & GYNECOLOGY
Payer: COMMERCIAL

## 2021-11-24 ENCOUNTER — TELEPHONE (OUTPATIENT)
Dept: OBGYN | Facility: CLINIC | Age: 36
End: 2021-11-24

## 2021-11-24 DIAGNOSIS — O26.90 PREGNANCY RELATED CONDITION, ANTEPARTUM: ICD-10-CM

## 2021-11-24 DIAGNOSIS — O40.9XX0 POLYHYDRAMNIOS AFFECTING PREGNANCY: Primary | ICD-10-CM

## 2021-11-24 PROBLEM — O36.60X0 LARGE FOR GESTATIONAL AGE FETUS AFFECTING MANAGEMENT OF MOTHER: Status: ACTIVE | Noted: 2021-11-24

## 2021-11-24 PROCEDURE — 76811 OB US DETAILED SNGL FETUS: CPT | Mod: 26 | Performed by: OBSTETRICS & GYNECOLOGY

## 2021-11-24 PROCEDURE — 76811 OB US DETAILED SNGL FETUS: CPT

## 2021-11-24 NOTE — TELEPHONE ENCOUNTER
Attempted to call pt to schedule glucose per MFM recommendations.  LM for her to CB to schedule glucose.    Kamala Salazar RN

## 2021-11-24 NOTE — PROGRESS NOTES
Please see full imaging report from [facility] program under imaging tab.    KATARINA 32 cm.     Gurjit Grady MD  Maternal Fetal Medicine

## 2021-11-24 NOTE — NURSING NOTE
Patient here for MFM ultrasound and with increased fluid. Dr. Grady requesting patient get tested for GDM. This RN reached out to Kamala NGUYEN RN at [facility] OB clinic to have clinic reach out to patient to set up diabetes screening.

## 2021-12-02 ENCOUNTER — ALLIED HEALTH/NURSE VISIT (OUTPATIENT)
Dept: NURSING | Facility: CLINIC | Age: 36
End: 2021-12-02
Payer: COMMERCIAL

## 2021-12-02 DIAGNOSIS — O36.60X0 LARGE FOR GESTATIONAL AGE FETUS AFFECTING MANAGEMENT OF MOTHER: ICD-10-CM

## 2021-12-02 DIAGNOSIS — O40.9XX0 POLYHYDRAMNIOS AFFECTING PREGNANCY: ICD-10-CM

## 2021-12-02 DIAGNOSIS — Z34.82 ENCOUNTER FOR SUPERVISION OF OTHER NORMAL PREGNANCY IN SECOND TRIMESTER: Primary | ICD-10-CM

## 2021-12-02 DIAGNOSIS — O09.32 INSUFFICIENT PRENATAL CARE IN SECOND TRIMESTER: ICD-10-CM

## 2021-12-02 LAB
ERYTHROCYTE [DISTWIDTH] IN BLOOD BY AUTOMATED COUNT: 13.5 % (ref 10–15)
HCT VFR BLD AUTO: 34.7 % (ref 35–47)
HGB BLD-MCNC: 11.4 G/DL (ref 11.7–15.7)
MCH RBC QN AUTO: 31.1 PG (ref 26.5–33)
MCHC RBC AUTO-ENTMCNC: 32.9 G/DL (ref 31.5–36.5)
MCV RBC AUTO: 95 FL (ref 78–100)
PLATELET # BLD AUTO: 176 10E3/UL (ref 150–450)
RBC # BLD AUTO: 3.66 10E6/UL (ref 3.8–5.2)
T PALLIDUM AB SER QL: NONREACTIVE
WBC # BLD AUTO: 6.1 10E3/UL (ref 4–11)

## 2021-12-02 PROCEDURE — 85027 COMPLETE CBC AUTOMATED: CPT

## 2021-12-02 PROCEDURE — 99207 PR NO CHARGE NURSE ONLY: CPT

## 2021-12-02 PROCEDURE — 82950 GLUCOSE TEST: CPT

## 2021-12-02 PROCEDURE — 86780 TREPONEMA PALLIDUM: CPT

## 2021-12-02 PROCEDURE — 36415 COLL VENOUS BLD VENIPUNCTURE: CPT

## 2021-12-02 NOTE — NURSING NOTE
"Chief Complaint   Patient presents with     Allied Health Visit     26w 0d, 1 hour glucose test and labs       Initial LMP 06/10/2021 (Approximate)  Estimated body mass index is 28.41 kg/m  as calculated from the following:    Height as of 19: 1.676 m (5' 6\").    Weight as of 21: 79.8 kg (176 lb).  BP completed using cuff size: NA (Not Taken)    Questioned patient about current smoking habits.  Pt. has never smoked.          "

## 2021-12-03 LAB — GLUCOSE 1H P 50 G GLC PO SERPL-MCNC: 166 MG/DL (ref 70–129)

## 2021-12-06 DIAGNOSIS — O99.810 ABNORMAL MATERNAL GLUCOSE TOLERANCE, ANTEPARTUM: Primary | ICD-10-CM

## 2021-12-08 ENCOUNTER — HOSPITAL ENCOUNTER (OUTPATIENT)
Dept: ULTRASOUND IMAGING | Facility: CLINIC | Age: 36
End: 2021-12-08
Attending: OBSTETRICS & GYNECOLOGY
Payer: COMMERCIAL

## 2021-12-08 ENCOUNTER — OFFICE VISIT (OUTPATIENT)
Dept: MATERNAL FETAL MEDICINE | Facility: CLINIC | Age: 36
End: 2021-12-08
Attending: OBSTETRICS & GYNECOLOGY
Payer: COMMERCIAL

## 2021-12-08 DIAGNOSIS — O40.9XX0 POLYHYDRAMNIOS AFFECTING PREGNANCY: Primary | ICD-10-CM

## 2021-12-08 DIAGNOSIS — O40.9XX0 POLYHYDRAMNIOS AFFECTING PREGNANCY: ICD-10-CM

## 2021-12-08 PROCEDURE — 76815 OB US LIMITED FETUS(S): CPT | Mod: 26 | Performed by: OBSTETRICS & GYNECOLOGY

## 2021-12-08 PROCEDURE — 76815 OB US LIMITED FETUS(S): CPT

## 2021-12-08 NOTE — PROGRESS NOTES
Please see full imaging report from [facility] program under imaging tab.    Severe polyhydramnios  Elevated GCT - needs GTT and OB follow up    Gurjit Grady MD  Maternal Fetal Medicine

## 2021-12-13 ENCOUNTER — LAB (OUTPATIENT)
Dept: LAB | Facility: CLINIC | Age: 36
End: 2021-12-13
Payer: COMMERCIAL

## 2021-12-13 DIAGNOSIS — O99.810 ABNORMAL MATERNAL GLUCOSE TOLERANCE, ANTEPARTUM: ICD-10-CM

## 2021-12-13 PROCEDURE — 36415 COLL VENOUS BLD VENIPUNCTURE: CPT

## 2021-12-13 PROCEDURE — 82951 GLUCOSE TOLERANCE TEST (GTT): CPT

## 2021-12-13 PROCEDURE — 82952 GTT-ADDED SAMPLES: CPT

## 2021-12-14 PROBLEM — O24.410 WHITE CLASSIFICATION A1 GESTATIONAL DIABETES MELLITUS (GDM), DIET CONTROLLED: Status: ACTIVE | Noted: 2021-12-14

## 2021-12-14 LAB
GESTATIONAL GTT 1 HR POST DOSE: 157 MG/DL (ref 60–179)
GESTATIONAL GTT 2 HR POST DOSE: 165 MG/DL (ref 60–154)
GESTATIONAL GTT 3 HR POST DOSE: 148 MG/DL (ref 60–139)
GLUCOSE P FAST SERPL-MCNC: 70 MG/DL (ref 60–94)

## 2021-12-15 DIAGNOSIS — O24.419 GESTATIONAL DIABETES MELLITUS, ANTEPARTUM: Primary | ICD-10-CM

## 2021-12-16 ENCOUNTER — TELEPHONE (OUTPATIENT)
Dept: MATERNAL FETAL MEDICINE | Facility: CLINIC | Age: 36
End: 2021-12-16
Payer: COMMERCIAL

## 2021-12-16 NOTE — TELEPHONE ENCOUNTER
Left message for Ashley regarding the need to reschedule her appt at [facility] on 12/16. Pt advised to call back to reschedule her BPP.     Radha Stearns RN on 12/16/2021 at 9:08 AM

## 2021-12-16 NOTE — TELEPHONE ENCOUNTER
Pt returning phone call. Pt not feeling well, congestion/cold like symptoms. Does not want to schedule appt for tomorrow, prefers next week. Confirms FM normal, no VB/LOF/cxn/OB concerns. Has not had GDM appt to start monitoring BG. Pt desires to cancel today, confirmed appt for Wed. Will also notify Dr. Pina that pt desires to r/s OB appt that was scheduled for 1pm. Pt aware writer to call [facility] office to cancel now, but pt plans to call tomorrow to r/s the appt for next week.

## 2021-12-22 ENCOUNTER — OFFICE VISIT (OUTPATIENT)
Dept: MATERNAL FETAL MEDICINE | Facility: CLINIC | Age: 36
End: 2021-12-22
Attending: OBSTETRICS & GYNECOLOGY
Payer: COMMERCIAL

## 2021-12-22 ENCOUNTER — HOSPITAL ENCOUNTER (OUTPATIENT)
Dept: ULTRASOUND IMAGING | Facility: CLINIC | Age: 36
End: 2021-12-22
Attending: OBSTETRICS & GYNECOLOGY
Payer: COMMERCIAL

## 2021-12-22 DIAGNOSIS — O40.9XX0 POLYHYDRAMNIOS AFFECTING PREGNANCY: ICD-10-CM

## 2021-12-22 DIAGNOSIS — O40.9XX0 POLYHYDRAMNIOS AFFECTING PREGNANCY: Primary | ICD-10-CM

## 2021-12-22 PROCEDURE — 76816 OB US FOLLOW-UP PER FETUS: CPT | Mod: 26 | Performed by: OBSTETRICS & GYNECOLOGY

## 2021-12-22 PROCEDURE — 76816 OB US FOLLOW-UP PER FETUS: CPT

## 2021-12-22 NOTE — PROGRESS NOTES
"Please see \"Imaging\" tab under \"Chart Review\" for details of today's US.    Roxanne Glover, DO      Discussed COVID vaccine, patient will consider and likely get it.   "

## 2022-01-05 ENCOUNTER — VIRTUAL VISIT (OUTPATIENT)
Dept: EDUCATION SERVICES | Facility: CLINIC | Age: 37
End: 2022-01-05
Payer: COMMERCIAL

## 2022-01-05 DIAGNOSIS — O24.419 GESTATIONAL DIABETES MELLITUS, ANTEPARTUM: ICD-10-CM

## 2022-01-05 PROCEDURE — G0108 DIAB MANAGE TRN  PER INDIV: HCPCS | Mod: 95 | Performed by: DIETITIAN, REGISTERED

## 2022-01-05 NOTE — PROGRESS NOTES
Type of Service: Telephone Visit    Originating Location (Patient Location): Home  Distant Location (Provider Location): Home  Mode of Communication:  Telephone    Telephone Visit Start Time: 9:05 am  Telephone Visit End Time (telephone visit stop time): 9:50 am    How would patient like to obtain AVS? Verbally discussed    Diabetes Self-Management Education & Support      SUBJECTIVE/OBJECTIVE:  Presents for education related to gestational diabetes.    Accompanied by: Self  Diabetes management related comments/concerns: Not had before. Has already started changing her diet. Is confused about which foods to eat. Feels hungry with trying to cut out sugar.     Gestational weeks: 30 w 6 d  Hospital planned for delivery: [facility]  Next OB Visit Date: 22  Number of previous pregnancies: 4  Had any babies over 9 lbs: Yes (8-9 lb babies)  Previously had Gestational Diabetes: No    Cultural Influences/Ethnic Background:  Chinese    Estimated Date of Delivery: Mar 10, 2022    1 hour OGTT  Lab Results   Component Value Date    GLU1 166 (H) 2021         3 hour OGTT    Fasting  Lab Results   Component Value Date    GTTGF 70 2021       1 hour  Lab Results   Component Value Date    GTTG1 157 2021       2 hour  Lab Results   Component Value Date    GTTG2 165 (H) 2021       3 hour  Lab Results   Component Value Date    GTTG3 148 (H) 2021       Lifestyle and Health Behaviors:  Exercise:: Yes  On average, minutes per day of exercise at this level: 20  Barrier to exercise: None  Meal planning/habits: None  Meals include: Breakfast,Lunch,Dinner,Afternoon Snack  Breakfast: cereal (cheerios) with milk OR oatmeal  Lunch: chicken sandwich  Dinner: flat bread with tomato sauce, cut out red meat  Snacks: cauliflower or salad  Beverages: Water,Milk  Biggest challenges to healthy eating: None  Pre-carlton vitamin?: Yes    Healthy Coping:  Emotional response to diabetes: Ready to learn  Informal Support system::  Family  Stage of change: PREPARATION (Decided to change - considering how)    Current Management:   diet    ASSESSMENT:  Ashley has been nervous about what to eat so is trying to avoid carbs and sugar as much as possible but feels hungry now. Would benefit from following the meal and plan adding in some carbs with snacks and at her meals.     INTERVENTION:  Patient was instructed on basics with BG monitoring, BG goals, BG testing frequency, and meal plan today. Encouraged her to have the pharmacy show her the meter or look online as there is usually youtube videos of how to use the various BG meters. She said she will likely look online.     Educational topics covered today:  GDM diagnosis, pathophysiology, Risks and Complications of GDM, Means of controlling GDM, Using a Blood Glucose Monitor, Blood Glucose Goals, Logging and Interpreting Glucose Results, When to Call a Diabetes Educator or OB Provider, Healthy Eating During Pregnancy, Counting Carbohydrates, Meal Planning for GDM, and Physical Activity    Educational materials provided today: sent via e-mail (she did not have at the start of our visit so asked [facility] to resend which they did)  Acosta Understanding Gestational Diabetes  GDM Log Book  Sharps Disposal  Care After Delivery    Pt verbalized understanding of concepts discussed and recommendations provided today.     PLAN:  Check glucose 4 times daily, before breakfast and 1 hour after each meal.     Follow up scheduled 1/17 and 1/24 via phone. She prefers weekly phone calls vs setting up mychart to follow up.     Check Ketones daily for one week, if negative, reduce testing to once a week.     Physical activity recommended: as able .    Meal plan: 2-3 carbs at breakfast, 3-4 carbs at lunch, 3-4 carbs at supper, 1-2 carbs at 3 snacks a day.  Follow consistent CHO meal plan, eat CHO and protein/fat at all meals/snacks.    Call/e-mail/MyChart message diabetes educator if 3 or more blood sugars are above the  goal in 1 week, if ketones are positive, or with questions/concerns.    MISTY Lopez CDE    Time Spent: 50 minutes  Encounter Type: Individual    Any diabetes medication dose changes were made via the CDE Protocol and Collaborative Practice Agreement with the patient's OB/GYN provider. A copy of this encounter was shared with the provider.

## 2022-01-05 NOTE — LETTER
1/5/2022         RE: Ashley Dias  [address]  [place] [address]        Dear Colleague,    Thank you for referring your patient, Ashley Dias, to the [facility]. Please see a copy of my visit note below.      Type of Service: Telephone Visit    Originating Location (Patient Location): Home  Distant Location (Provider Location): Home  Mode of Communication:  Telephone    Telephone Visit Start Time: 9:05 am  Telephone Visit End Time (telephone visit stop time): 9:50 am    How would patient like to obtain AVS? Verbally discussed    Diabetes Self-Management Education & Support      SUBJECTIVE/OBJECTIVE:  Presents for education related to gestational diabetes.    Accompanied by: Self  Diabetes management related comments/concerns: Not had before. Has already started changing her diet. Is confused about which foods to eat. Feels hungry with trying to cut out sugar.     Gestational weeks: 30 w 6 d  Hospital planned for delivery: [facility]  Next OB Visit Date: 01/06/22  Number of previous pregnancies: 4  Had any babies over 9 lbs: Yes (8-9 lb babies)  Previously had Gestational Diabetes: No    Cultural Influences/Ethnic Background:  Cuban    Estimated Date of Delivery: Mar 10, 2022    1 hour OGTT  Lab Results   Component Value Date    GLU1 166 (H) 12/02/2021         3 hour OGTT    Fasting  Lab Results   Component Value Date    GTTGF 70 12/13/2021       1 hour  Lab Results   Component Value Date    GTTG1 157 12/13/2021       2 hour  Lab Results   Component Value Date    GTTG2 165 (H) 12/13/2021       3 hour  Lab Results   Component Value Date    GTTG3 148 (H) 12/13/2021       Lifestyle and Health Behaviors:  Exercise:: Yes  On average, minutes per day of exercise at this level: 20  Barrier to exercise: None  Meal planning/habits: None  Meals include: Breakfast,Lunch,Dinner,Afternoon Snack  Breakfast: cereal (cheerios) with milk OR oatmeal  Lunch: chicken sandwich  Dinner: flat bread with tomato  sauce, cut out red meat  Snacks: cauliflower or salad  Beverages: Water,Milk  Biggest challenges to healthy eating: None  Pre-carlton vitamin?: Yes    Healthy Coping:  Emotional response to diabetes: Ready to learn  Informal Support system:: Family  Stage of change: PREPARATION (Decided to change - considering how)    Current Management:   diet    ASSESSMENT:  Ashley has been nervous about what to eat so is trying to avoid carbs and sugar as much as possible but feels hungry now. Would benefit from following the meal and plan adding in some carbs with snacks and at her meals.     INTERVENTION:  Patient was instructed on basics with BG monitoring, BG goals, BG testing frequency, and meal plan today. Encouraged her to have the pharmacy show her the meter or look online as there is usually youtube videos of how to use the various BG meters. She said she will likely look online.     Educational topics covered today:  GDM diagnosis, pathophysiology, Risks and Complications of GDM, Means of controlling GDM, Using a Blood Glucose Monitor, Blood Glucose Goals, Logging and Interpreting Glucose Results, When to Call a Diabetes Educator or OB Provider, Healthy Eating During Pregnancy, Counting Carbohydrates, Meal Planning for GDM, and Physical Activity    Educational materials provided today: sent via e-mail (she did not have at the start of our visit so asked [facility] to resend which they did)  Acosta Understanding Gestational Diabetes  GDM Log Book  Sharps Disposal  Care After Delivery    Pt verbalized understanding of concepts discussed and recommendations provided today.     PLAN:  Check glucose 4 times daily, before breakfast and 1 hour after each meal.     Follow up scheduled  and  via phone. She prefers weekly phone calls vs setting up mychart to follow up.     Check Ketones daily for one week, if negative, reduce testing to once a week.     Physical activity recommended: as able .    Meal plan: 2-3 carbs at  breakfast, 3-4 carbs at lunch, 3-4 carbs at supper, 1-2 carbs at 3 snacks a day.  Follow consistent CHO meal plan, eat CHO and protein/fat at all meals/snacks.    Call/e-mail/MyChart message diabetes educator if 3 or more blood sugars are above the goal in 1 week, if ketones are positive, or with questions/concerns.    MISTY Lopez CDE    Time Spent: 50 minutes  Encounter Type: Individual    Any diabetes medication dose changes were made via the CDE Protocol and Collaborative Practice Agreement with the patient's OB/GYN provider. A copy of this encounter was shared with the provider.

## 2022-01-06 ENCOUNTER — PRENATAL OFFICE VISIT (OUTPATIENT)
Dept: OBGYN | Facility: CLINIC | Age: 37
End: 2022-01-06
Payer: COMMERCIAL

## 2022-01-06 VITALS — WEIGHT: 168.4 LBS | DIASTOLIC BLOOD PRESSURE: 56 MMHG | SYSTOLIC BLOOD PRESSURE: 102 MMHG | BODY MASS INDEX: 27.18 KG/M2

## 2022-01-06 DIAGNOSIS — O24.410 DIET CONTROLLED GESTATIONAL DIABETES MELLITUS (GDM), ANTEPARTUM: ICD-10-CM

## 2022-01-06 DIAGNOSIS — Z34.83 ENCOUNTER FOR SUPERVISION OF OTHER NORMAL PREGNANCY IN THIRD TRIMESTER: Primary | ICD-10-CM

## 2022-01-06 PROCEDURE — 99207 PR PRENATAL VISIT: CPT | Performed by: OBSTETRICS & GYNECOLOGY

## 2022-01-06 NOTE — PROGRESS NOTES
37yo GP4 at 31wks w/ recent dx of GDMA1.  Picking up supplies today.  No Hx GDM.  Baby active.  RTC 2 weeks

## 2022-01-06 NOTE — NURSING NOTE
"Chief Complaint   Patient presents with     Prenatal Care   31w0d  New GDM - picking up supplies today    initial /56   Wt 76.4 kg (168 lb 6.4 oz)   LMP 06/10/2021 (Approximate)   BMI 27.18 kg/m   Estimated body mass index is 27.18 kg/m  as calculated from the following:    Height as of 4/16/19: 1.676 m (5' 6\").    Weight as of this encounter: 76.4 kg (168 lb 6.4 oz).  BP completed using cuff size regular.  Marisol Flaherty CMA    "

## 2022-01-19 ENCOUNTER — NURSE TRIAGE (OUTPATIENT)
Dept: NURSING | Facility: CLINIC | Age: 37
End: 2022-01-19
Payer: COMMERCIAL

## 2022-01-20 NOTE — TELEPHONE ENCOUNTER
Triage Call:    32w6d pregnant patient called to inquire if she can use monistat cream for her yeast infection.  She reports she had received approval to use by her OB prior to developing gestational diabetes.  She has used monistat for previous pregnancies.  She declined triage.  Transferred patient to 24 hour pharmacy for medication advise.    Socorro Mitchell RN  01/19/22 9:53 PM  [facility] Nurse Advisor    COVID 19 Nurse Triage Plan/Patient Instructions    Please be aware that novel coronavirus (COVID-19) may be circulating in the community. If you develop symptoms such as fever, cough, or SOB or if you have concerns about the presence of another infection including coronavirus (COVID-19), please contact your health care provider or visit [email or web address].     Disposition/Instructions    Home care recommended. Follow home care protocol based instructions.    Thank you for taking steps to prevent the spread of this virus.  o Limit your contact with others.  o Wear a simple mask to cover your cough.  o Wash your hands well and often.    Resources    [facility]: About COVID-19: [email or web address]    CDC: What to Do If You're Sick: www.cdc.gov/coronavirus/2019-ncov/about/steps-when-sick.html    CDC: Ending Home Isolation: www.cdc.gov/coronavirus/2019-ncov/hcp/disposition-in-home-patients.html     CDC: Caring for Someone: www.cdc.gov/coronavirus/2019-ncov/if-you-are-sick/care-for-someone.html     [facility]: Interim Guidance for Hospital Discharge to Home: www.[email or web address].[facility].[place]/diseases/coronavirus/hcp/hospdischarge.pdf    [facility] clinical trials (COVID-19 research studies): [email or web address]     Below are the COVID-19 hotlines at the [facility] ([facility]). Interpreters are available.   o For health questions: Call [phone] or 1-130.256.3309 (7 a.m. to 7 p.m.)  o For questions about schools and childcare: Call  "[phone] or 1-404.711.2349 (7 a.m. to 7 p.m.)       Reason for Disposition    [1] Caller has medication question about med not prescribed by PCP AND [2] triager unable to answer question (e.g., compatibility with other med, storage)    Additional Information    Negative: Drug overdose and triager unable to answer question    Negative: Caller requesting information unrelated to medicine    Negative: Caller requesting a prescription for Strep throat and has a positive culture result    Negative: Rash while taking a medication or within 3 days of stopping it    Negative: Immunization reaction suspected    Negative: [1] Asthma and [2] having symptoms of asthma (cough, wheezing, etc.)    Negative: [1] Influenza symptoms AND [2] anti-viral med prescription request, such as Tamiflu    Negative: [1] Symptom of illness (e.g., headache, abdominal pain, earache, vomiting) AND [2] more than mild    Negative: MORE THAN A DOUBLE DOSE of a prescription or over-the-counter (OTC) drug    Negative: [1] DOUBLE DOSE (an extra dose or lesser amount) of over-the-counter (OTC) drug AND [2] any symptoms (e.g., dizziness, nausea, pain, sleepiness)    Negative: [1] DOUBLE DOSE (an extra dose or lesser amount) of prescription drug AND [2] any symptoms (e.g., dizziness, nausea, pain, sleepiness)    Negative: Took another person's prescription drug    Negative: [1] DOUBLE DOSE (an extra dose or lesser amount) of prescription drug AND [2] NO symptoms (Exception: a double dose of antibiotics)    Negative: Diabetes drug error or overdose (e.g., took wrong type of insulin or took extra dose)    Negative: [1] Request for URGENT new prescription or refill of \"essential\" medication (i.e., likelihood of harm to patient if not taken) AND [2] triager unable to fill per unit policy    Negative: [1] Prescription not at pharmacy AND [2] was prescribed by PCP recently    Negative: [1] Pharmacy calling with prescription questions AND [2] triager unable to " answer question    Negative: [1] Caller has URGENT medication question about med that PCP or specialist prescribed AND [2] triager unable to answer question    Negative: [1] Caller has NON-URGENT medication question about med that PCP prescribed AND [2] triager unable to answer question    Negative: [1] Caller requesting a NON-URGENT new prescription or refill AND [2] triager unable to refill per unit policy    Protocols used: MEDICATION QUESTION CALL-A-AH

## 2022-01-23 ENCOUNTER — HEALTH MAINTENANCE LETTER (OUTPATIENT)
Age: 37
End: 2022-01-23

## 2022-01-24 ENCOUNTER — VIRTUAL VISIT (OUTPATIENT)
Dept: EDUCATION SERVICES | Facility: CLINIC | Age: 37
End: 2022-01-24
Payer: COMMERCIAL

## 2022-01-24 DIAGNOSIS — O24.410 DIET CONTROLLED GESTATIONAL DIABETES MELLITUS (GDM), ANTEPARTUM: Primary | ICD-10-CM

## 2022-01-24 PROCEDURE — 98968 PH1 ASSMT&MGMT NQHP 21-30: CPT | Mod: 95 | Performed by: DIETITIAN, REGISTERED

## 2022-01-24 NOTE — PROGRESS NOTES
Diabetes and Pregnancy Follow-up  Type of Service: Telephone Visit    How would patient like to obtain AVS? Not needed    Subjective/Objective:    Ashley Dias was called for a scheduled BG review. Last date of communication was: 1/10/2022.    Gestational diabetes is being managed with diet and activity    Taking diabetes medications: no    Estimated Date of Delivery: Mar 10, 2022    Blood Glucose/Ketone Log:    Date Ketones Fasting Post Breakfast Post Lunch Post Supper   1/18  85  171    1/19  97      1/20  95      1/21  82      1/22  83      1/23  92  - -   1/24  - - 153      1/18 -birthday cake    Assessment:    Ketones: not started.   Fasting blood glucoses: 83% in target.  After breakfast: x% in target.  After lunch: 0??% in target.  After dinner: x% in target.    Admits she is not testing as much as she should, as she gets busy with kids. She did not have the correct times on her meter, so unsure if her fasting numbers were really fasting (she couldn't remember either, but thought she maybe only tested once per day lately). Her post meal numbers were elevated, however she thought she had tested after eating more often and those were normal.     Plan/Response:  Follow-up in 1 week.  She will write down her blood sugars, encouraging 4x/day testing as well as ketone testing. Also encouraged her to write down meals that show elevated readings.    MISTY Murray [facility] [place]  Time Spent: 21:25 minutes    Any diabetes medication dose changes were made via the CDE Protocol and Collaborative Practice Agreement with the patient's OB/GYN provider. A copy of this encounter was shared with the provider.

## 2022-01-27 DIAGNOSIS — O24.410 WHITE CLASSIFICATION A1 GESTATIONAL DIABETES MELLITUS (GDM), DIET CONTROLLED: ICD-10-CM

## 2022-01-27 DIAGNOSIS — O40.9XX0 POLYHYDRAMNIOS AFFECTING PREGNANCY: Primary | ICD-10-CM

## 2022-01-28 ENCOUNTER — PRENATAL OFFICE VISIT (OUTPATIENT)
Dept: OBGYN | Facility: CLINIC | Age: 37
End: 2022-01-28
Payer: COMMERCIAL

## 2022-01-28 VITALS — DIASTOLIC BLOOD PRESSURE: 66 MMHG | SYSTOLIC BLOOD PRESSURE: 98 MMHG | WEIGHT: 167.8 LBS | BODY MASS INDEX: 27.08 KG/M2

## 2022-01-28 DIAGNOSIS — O24.410 DIET CONTROLLED GESTATIONAL DIABETES MELLITUS (GDM), ANTEPARTUM: ICD-10-CM

## 2022-01-28 DIAGNOSIS — Z34.83 ENCOUNTER FOR SUPERVISION OF OTHER NORMAL PREGNANCY IN THIRD TRIMESTER: Primary | ICD-10-CM

## 2022-01-28 PROCEDURE — 99207 PR PRENATAL VISIT: CPT | Performed by: OBSTETRICS & GYNECOLOGY

## 2022-01-28 RX ORDER — LANCETS
EACH MISCELLANEOUS
COMMUNITY
Start: 2022-01-05

## 2022-01-28 NOTE — NURSING NOTE
"Chief Complaint   Patient presents with     Prenatal Care     34 weeks 1 day         Initial BP 98/66 (BP Location: Left arm, Cuff Size: Adult Regular)   Wt 76.1 kg (167 lb 12.8 oz)   LMP 06/10/2021 (Approximate)   Breastfeeding No   BMI 27.08 kg/m   Estimated body mass index is 27.08 kg/m  as calculated from the following:    Height as of 19: 1.676 m (5' 6\").    Weight as of this encounter: 76.1 kg (167 lb 12.8 oz).  BP completed using cuff size: regular    Questioned patient about current smoking habits.  Pt. has never smoked.    34w1d      The following HM Due: NONE        +FM daily       Melvina Cota, CMA on 2022 at 10:37 AM           "

## 2022-01-28 NOTE — PROGRESS NOTES
37yo  at 34w1d with GDMA1 - BS control good.  Meets with DE next weeka dn MFM follow up next week - polyhydramnios and hepatic cyst seen at 28 week scan.  RTC 2 weeks

## 2022-02-01 ENCOUNTER — TELEPHONE (OUTPATIENT)
Dept: OBGYN | Facility: CLINIC | Age: 37
End: 2022-02-01
Payer: COMMERCIAL

## 2022-02-01 ENCOUNTER — HOSPITAL ENCOUNTER (OUTPATIENT)
Dept: ULTRASOUND IMAGING | Facility: CLINIC | Age: 37
End: 2022-02-01
Attending: OBSTETRICS & GYNECOLOGY
Payer: COMMERCIAL

## 2022-02-01 ENCOUNTER — OFFICE VISIT (OUTPATIENT)
Dept: MATERNAL FETAL MEDICINE | Facility: CLINIC | Age: 37
End: 2022-02-01
Attending: OBSTETRICS & GYNECOLOGY
Payer: COMMERCIAL

## 2022-02-01 DIAGNOSIS — O24.419 GESTATIONAL DIABETES MELLITUS (GDM), ANTEPARTUM, GESTATIONAL DIABETES METHOD OF CONTROL UNSPECIFIED: Primary | ICD-10-CM

## 2022-02-01 DIAGNOSIS — O40.9XX0 POLYHYDRAMNIOS AFFECTING PREGNANCY: ICD-10-CM

## 2022-02-01 PROCEDURE — 76816 OB US FOLLOW-UP PER FETUS: CPT

## 2022-02-01 PROCEDURE — 76816 OB US FOLLOW-UP PER FETUS: CPT | Mod: 26 | Performed by: OBSTETRICS & GYNECOLOGY

## 2022-02-01 NOTE — TELEPHONE ENCOUNTER
Covid19 Exemption Form  Signed by Dr. Pina    With expiration being due date- and saying we do suggest covid19 vaccine (along with CDC), but patient using precaution.

## 2022-02-01 NOTE — PROGRESS NOTES
"Please see \"Imaging\" tab under \"Chart Review\" for details of today's visit.    Brady Elizabeth    "

## 2022-02-02 PROBLEM — O40.9XX0 POLYHYDRAMNIOS AFFECTING PREGNANCY: Status: RESOLVED | Noted: 2021-11-24 | Resolved: 2022-02-02

## 2022-02-07 ENCOUNTER — VIRTUAL VISIT (OUTPATIENT)
Dept: EDUCATION SERVICES | Facility: CLINIC | Age: 37
End: 2022-02-07
Payer: COMMERCIAL

## 2022-02-07 DIAGNOSIS — Z91.199 NO-SHOW FOR APPOINTMENT: Primary | ICD-10-CM

## 2022-02-11 ENCOUNTER — PRENATAL OFFICE VISIT (OUTPATIENT)
Dept: OBGYN | Facility: CLINIC | Age: 37
End: 2022-02-11
Payer: COMMERCIAL

## 2022-02-11 VITALS — WEIGHT: 168 LBS | DIASTOLIC BLOOD PRESSURE: 60 MMHG | SYSTOLIC BLOOD PRESSURE: 98 MMHG | BODY MASS INDEX: 27.12 KG/M2

## 2022-02-11 DIAGNOSIS — Z34.83 ENCOUNTER FOR SUPERVISION OF OTHER NORMAL PREGNANCY IN THIRD TRIMESTER: Primary | ICD-10-CM

## 2022-02-11 DIAGNOSIS — O24.410 DIET CONTROLLED GESTATIONAL DIABETES MELLITUS (GDM), ANTEPARTUM: ICD-10-CM

## 2022-02-11 PROCEDURE — 87653 STREP B DNA AMP PROBE: CPT | Performed by: OBSTETRICS & GYNECOLOGY

## 2022-02-11 PROCEDURE — 99207 PR PRENATAL VISIT: CPT | Performed by: OBSTETRICS & GYNECOLOGY

## 2022-02-11 NOTE — NURSING NOTE
"Chief Complaint   Patient presents with     Prenatal Care   36w1d  No concerns    initial BP 98/60   Wt 76.2 kg (168 lb)   LMP 06/10/2021 (Approximate)   BMI 27.12 kg/m   Estimated body mass index is 27.12 kg/m  as calculated from the following:    Height as of 4/16/19: 1.676 m (5' 6\").    Weight as of this encounter: 76.2 kg (168 lb).  BP completed using cuff size regular.  Marisol Flaherty CMA    "

## 2022-02-12 LAB
GP B STREP DNA SPEC QL NAA+PROBE: NEGATIVE
PATIENT PENICILLIN, AMOXICILLIN, CEPHALOSPORINS ALLERGY: NO

## 2022-02-23 ENCOUNTER — OFFICE VISIT (OUTPATIENT)
Dept: MATERNAL FETAL MEDICINE | Facility: CLINIC | Age: 37
End: 2022-02-23
Attending: OBSTETRICS & GYNECOLOGY
Payer: COMMERCIAL

## 2022-02-23 ENCOUNTER — PRENATAL OFFICE VISIT (OUTPATIENT)
Dept: OBGYN | Facility: CLINIC | Age: 37
End: 2022-02-23
Payer: COMMERCIAL

## 2022-02-23 ENCOUNTER — HOSPITAL ENCOUNTER (OUTPATIENT)
Dept: ULTRASOUND IMAGING | Facility: CLINIC | Age: 37
End: 2022-02-23
Attending: OBSTETRICS & GYNECOLOGY
Payer: COMMERCIAL

## 2022-02-23 VITALS
SYSTOLIC BLOOD PRESSURE: 110 MMHG | WEIGHT: 163 LBS | BODY MASS INDEX: 26.2 KG/M2 | HEIGHT: 66 IN | DIASTOLIC BLOOD PRESSURE: 70 MMHG

## 2022-02-23 DIAGNOSIS — Z23 ENCOUNTER FOR IMMUNIZATION: ICD-10-CM

## 2022-02-23 DIAGNOSIS — O24.410 DIET CONTROLLED GESTATIONAL DIABETES MELLITUS (GDM), ANTEPARTUM: ICD-10-CM

## 2022-02-23 DIAGNOSIS — Z34.83 ENCOUNTER FOR SUPERVISION OF OTHER NORMAL PREGNANCY IN THIRD TRIMESTER: Primary | ICD-10-CM

## 2022-02-23 DIAGNOSIS — O24.419 GESTATIONAL DIABETES MELLITUS (GDM), ANTEPARTUM, GESTATIONAL DIABETES METHOD OF CONTROL UNSPECIFIED: ICD-10-CM

## 2022-02-23 DIAGNOSIS — O24.419 GESTATIONAL DIABETES MELLITUS (GDM), ANTEPARTUM, GESTATIONAL DIABETES METHOD OF CONTROL UNSPECIFIED: Primary | ICD-10-CM

## 2022-02-23 PROCEDURE — 76816 OB US FOLLOW-UP PER FETUS: CPT

## 2022-02-23 PROCEDURE — 76819 FETAL BIOPHYS PROFIL W/O NST: CPT | Mod: 26 | Performed by: OBSTETRICS & GYNECOLOGY

## 2022-02-23 PROCEDURE — 76819 FETAL BIOPHYS PROFIL W/O NST: CPT

## 2022-02-23 PROCEDURE — 90715 TDAP VACCINE 7 YRS/> IM: CPT | Performed by: OBSTETRICS & GYNECOLOGY

## 2022-02-23 PROCEDURE — 99207 PR PRENATAL VISIT: CPT | Performed by: OBSTETRICS & GYNECOLOGY

## 2022-02-23 PROCEDURE — 90471 IMMUNIZATION ADMIN: CPT | Performed by: OBSTETRICS & GYNECOLOGY

## 2022-02-23 PROCEDURE — 76816 OB US FOLLOW-UP PER FETUS: CPT | Mod: 26 | Performed by: OBSTETRICS & GYNECOLOGY

## 2022-02-23 NOTE — NURSING NOTE
"Chief Complaint   Patient presents with     Prenatal Care     37 6/7 weeks       Initial /70 (BP Location: Left arm, Patient Position: Chair, Cuff Size: Adult Regular)   Ht 1.676 m (5' 6\")   Wt 73.9 kg (163 lb)   LMP 06/10/2021 (Approximate)   Breastfeeding No   BMI 26.31 kg/m   Estimated body mass index is 26.31 kg/m  as calculated from the following:    Height as of this encounter: 1.676 m (5' 6\").    Weight as of this encounter: 73.9 kg (163 lb).  BP completed using cuff size: regular    Questioned patient about current smoking habits.  Pt. has never smoked.          The following HM Due: NONE    +fetal movement  -swelling    Mary Guerrero, CMA        "

## 2022-02-23 NOTE — PROGRESS NOTES
35yo  at 37w6d w GDMA1.  Has not been compliant with DE follow up but states her sugars have been all good in AM and just a few elevated post prandial.  Baby active. Has MFM BPP today.  Discussed that indxn at 39 wks should be considered as her GDM has not been optimally followed.  RTC 1 week

## 2022-02-23 NOTE — PROGRESS NOTES
"Please see \"Imaging\" tab under Chart Review for full details.    Susana Thapa MD  Maternal Fetal Medicine    "

## 2022-03-01 ENCOUNTER — PRENATAL OFFICE VISIT (OUTPATIENT)
Dept: OBGYN | Facility: CLINIC | Age: 37
End: 2022-03-01
Payer: COMMERCIAL

## 2022-03-01 VITALS — BODY MASS INDEX: 26.63 KG/M2 | SYSTOLIC BLOOD PRESSURE: 102 MMHG | DIASTOLIC BLOOD PRESSURE: 60 MMHG | WEIGHT: 165 LBS

## 2022-03-01 DIAGNOSIS — O24.410 DIET CONTROLLED GESTATIONAL DIABETES MELLITUS (GDM), ANTEPARTUM: ICD-10-CM

## 2022-03-01 DIAGNOSIS — Z34.83 ENCOUNTER FOR SUPERVISION OF OTHER NORMAL PREGNANCY IN THIRD TRIMESTER: Primary | ICD-10-CM

## 2022-03-01 PROCEDURE — 99207 PR PRENATAL VISIT: CPT | Performed by: OBSTETRICS & GYNECOLOGY

## 2022-03-01 NOTE — PROGRESS NOTES
35yo  at 38w5d here for routine visit.  Baby active.  States BS all OK but little documentation.  GDMA1 w/o close follow up.  Offered induction at 39 wks given very favorable cervix.  Declined.  RTC 1 weeks.  Advised avoiding post due pregnancy.

## 2022-03-07 ENCOUNTER — HOSPITAL ENCOUNTER (INPATIENT)
Facility: CLINIC | Age: 37
LOS: 2 days | Discharge: HOME | End: 2022-03-09
Attending: OBSTETRICS & GYNECOLOGY | Admitting: OBSTETRICS & GYNECOLOGY
Payer: COMMERCIAL

## 2022-03-07 LAB
ABO/RH(D): NORMAL
ANTIBODY SCREEN: NEGATIVE
GLUCOSE BLD-MCNC: 90 MG/DL (ref 70–99)
HGB BLD-MCNC: 12.9 G/DL (ref 11.7–15.7)
SARS-COV-2 RNA RESP QL NAA+PROBE: NEGATIVE
SPECIMEN EXPIRATION DATE: NORMAL

## 2022-03-07 PROCEDURE — 85018 HEMOGLOBIN: CPT | Performed by: OBSTETRICS & GYNECOLOGY

## 2022-03-07 PROCEDURE — 82947 ASSAY GLUCOSE BLOOD QUANT: CPT | Performed by: OBSTETRICS & GYNECOLOGY

## 2022-03-07 PROCEDURE — 86780 TREPONEMA PALLIDUM: CPT | Performed by: OBSTETRICS & GYNECOLOGY

## 2022-03-07 PROCEDURE — 722N000001 HC LABOR CARE VAGINAL DELIVERY SINGLE

## 2022-03-07 PROCEDURE — 87635 SARS-COV-2 COVID-19 AMP PRB: CPT | Performed by: OBSTETRICS & GYNECOLOGY

## 2022-03-07 PROCEDURE — 250N000013 HC RX MED GY IP 250 OP 250 PS 637: Performed by: OBSTETRICS & GYNECOLOGY

## 2022-03-07 PROCEDURE — 88307 TISSUE EXAM BY PATHOLOGIST: CPT | Mod: 26 | Performed by: PATHOLOGY

## 2022-03-07 PROCEDURE — 250N000009 HC RX 250: Performed by: OBSTETRICS & GYNECOLOGY

## 2022-03-07 PROCEDURE — 86901 BLOOD TYPING SEROLOGIC RH(D): CPT | Performed by: OBSTETRICS & GYNECOLOGY

## 2022-03-07 PROCEDURE — 59400 OBSTETRICAL CARE: CPT | Performed by: OBSTETRICS & GYNECOLOGY

## 2022-03-07 PROCEDURE — 88307 TISSUE EXAM BY PATHOLOGIST: CPT | Mod: TC | Performed by: OBSTETRICS & GYNECOLOGY

## 2022-03-07 PROCEDURE — 250N000011 HC RX IP 250 OP 636: Performed by: OBSTETRICS & GYNECOLOGY

## 2022-03-07 PROCEDURE — 120N000001 HC R&B MED SURG/OB

## 2022-03-07 PROCEDURE — 36415 COLL VENOUS BLD VENIPUNCTURE: CPT | Performed by: OBSTETRICS & GYNECOLOGY

## 2022-03-07 PROCEDURE — 86850 RBC ANTIBODY SCREEN: CPT | Performed by: OBSTETRICS & GYNECOLOGY

## 2022-03-07 RX ORDER — MISOPROSTOL 200 UG/1
400 TABLET ORAL
Status: DISCONTINUED | OUTPATIENT
Start: 2022-03-07 | End: 2022-03-09 | Stop reason: HOSPADM

## 2022-03-07 RX ORDER — OXYTOCIN 10 [USP'U]/ML
10 INJECTION, SOLUTION INTRAMUSCULAR; INTRAVENOUS
Status: DISCONTINUED | OUTPATIENT
Start: 2022-03-07 | End: 2022-03-07 | Stop reason: HOSPADM

## 2022-03-07 RX ORDER — METOCLOPRAMIDE 10 MG/1
10 TABLET ORAL EVERY 6 HOURS PRN
Status: DISCONTINUED | OUTPATIENT
Start: 2022-03-07 | End: 2022-03-07 | Stop reason: HOSPADM

## 2022-03-07 RX ORDER — LIDOCAINE 40 MG/G
CREAM TOPICAL
Status: DISCONTINUED | OUTPATIENT
Start: 2022-03-07 | End: 2022-03-07 | Stop reason: HOSPADM

## 2022-03-07 RX ORDER — OXYTOCIN/0.9 % SODIUM CHLORIDE 30/500 ML
340 PLASTIC BAG, INJECTION (ML) INTRAVENOUS CONTINUOUS PRN
Status: DISCONTINUED | OUTPATIENT
Start: 2022-03-07 | End: 2022-03-07 | Stop reason: HOSPADM

## 2022-03-07 RX ORDER — NALOXONE HYDROCHLORIDE 0.4 MG/ML
0.2 INJECTION, SOLUTION INTRAMUSCULAR; INTRAVENOUS; SUBCUTANEOUS
Status: DISCONTINUED | OUTPATIENT
Start: 2022-03-07 | End: 2022-03-07 | Stop reason: HOSPADM

## 2022-03-07 RX ORDER — ONDANSETRON 2 MG/ML
4 INJECTION INTRAMUSCULAR; INTRAVENOUS EVERY 6 HOURS PRN
Status: DISCONTINUED | OUTPATIENT
Start: 2022-03-07 | End: 2022-03-07 | Stop reason: HOSPADM

## 2022-03-07 RX ORDER — SODIUM CHLORIDE 9 MG/ML
INJECTION, SOLUTION INTRAVENOUS CONTINUOUS
Status: DISCONTINUED | OUTPATIENT
Start: 2022-03-07 | End: 2022-03-07 | Stop reason: HOSPADM

## 2022-03-07 RX ORDER — OXYTOCIN/0.9 % SODIUM CHLORIDE 30/500 ML
340 PLASTIC BAG, INJECTION (ML) INTRAVENOUS CONTINUOUS PRN
Status: DISCONTINUED | OUTPATIENT
Start: 2022-03-07 | End: 2022-03-09 | Stop reason: HOSPADM

## 2022-03-07 RX ORDER — NALOXONE HYDROCHLORIDE 0.4 MG/ML
0.4 INJECTION, SOLUTION INTRAMUSCULAR; INTRAVENOUS; SUBCUTANEOUS
Status: DISCONTINUED | OUTPATIENT
Start: 2022-03-07 | End: 2022-03-07 | Stop reason: HOSPADM

## 2022-03-07 RX ORDER — FENTANYL CITRATE 50 UG/ML
50-100 INJECTION, SOLUTION INTRAMUSCULAR; INTRAVENOUS
Status: DISCONTINUED | OUTPATIENT
Start: 2022-03-07 | End: 2022-03-07 | Stop reason: HOSPADM

## 2022-03-07 RX ORDER — OXYTOCIN 10 [USP'U]/ML
10 INJECTION, SOLUTION INTRAMUSCULAR; INTRAVENOUS
Status: DISCONTINUED | OUTPATIENT
Start: 2022-03-07 | End: 2022-03-09 | Stop reason: HOSPADM

## 2022-03-07 RX ORDER — OXYTOCIN 10 [USP'U]/ML
10 INJECTION, SOLUTION INTRAMUSCULAR; INTRAVENOUS
Status: DISCONTINUED | OUTPATIENT
Start: 2022-03-07 | End: 2022-03-08

## 2022-03-07 RX ORDER — MISOPROSTOL 200 UG/1
800 TABLET ORAL
Status: DISCONTINUED | OUTPATIENT
Start: 2022-03-07 | End: 2022-03-07 | Stop reason: HOSPADM

## 2022-03-07 RX ORDER — METHYLERGONOVINE MALEATE 0.2 MG/ML
200 INJECTION INTRAVENOUS
Status: DISCONTINUED | OUTPATIENT
Start: 2022-03-07 | End: 2022-03-09 | Stop reason: HOSPADM

## 2022-03-07 RX ORDER — TRANEXAMIC ACID 10 MG/ML
1 INJECTION, SOLUTION INTRAVENOUS EVERY 30 MIN PRN
Status: DISCONTINUED | OUTPATIENT
Start: 2022-03-07 | End: 2022-03-07 | Stop reason: HOSPADM

## 2022-03-07 RX ORDER — KETOROLAC TROMETHAMINE 30 MG/ML
30 INJECTION, SOLUTION INTRAMUSCULAR; INTRAVENOUS
Status: DISCONTINUED | OUTPATIENT
Start: 2022-03-07 | End: 2022-03-08

## 2022-03-07 RX ORDER — DOCUSATE SODIUM 100 MG/1
100 CAPSULE, LIQUID FILLED ORAL 2 TIMES DAILY
COMMUNITY

## 2022-03-07 RX ORDER — METHYLERGONOVINE MALEATE 0.2 MG/ML
200 INJECTION INTRAVENOUS
Status: DISCONTINUED | OUTPATIENT
Start: 2022-03-07 | End: 2022-03-07 | Stop reason: HOSPADM

## 2022-03-07 RX ORDER — TRANEXAMIC ACID 10 MG/ML
1 INJECTION, SOLUTION INTRAVENOUS EVERY 30 MIN PRN
Status: DISCONTINUED | OUTPATIENT
Start: 2022-03-07 | End: 2022-03-09 | Stop reason: HOSPADM

## 2022-03-07 RX ORDER — BISACODYL 10 MG
10 SUPPOSITORY, RECTAL RECTAL DAILY PRN
Status: DISCONTINUED | OUTPATIENT
Start: 2022-03-07 | End: 2022-03-09 | Stop reason: HOSPADM

## 2022-03-07 RX ORDER — CARBOPROST TROMETHAMINE 250 UG/ML
250 INJECTION, SOLUTION INTRAMUSCULAR
Status: DISCONTINUED | OUTPATIENT
Start: 2022-03-07 | End: 2022-03-07 | Stop reason: HOSPADM

## 2022-03-07 RX ORDER — IBUPROFEN 600 MG/1
600 TABLET, FILM COATED ORAL
Status: DISCONTINUED | OUTPATIENT
Start: 2022-03-07 | End: 2022-03-08

## 2022-03-07 RX ORDER — MISOPROSTOL 200 UG/1
800 TABLET ORAL
Status: DISCONTINUED | OUTPATIENT
Start: 2022-03-07 | End: 2022-03-09 | Stop reason: HOSPADM

## 2022-03-07 RX ORDER — DEXTROSE MONOHYDRATE 25 G/50ML
25-50 INJECTION, SOLUTION INTRAVENOUS
Status: DISCONTINUED | OUTPATIENT
Start: 2022-03-07 | End: 2022-03-09 | Stop reason: HOSPADM

## 2022-03-07 RX ORDER — METOCLOPRAMIDE HYDROCHLORIDE 5 MG/ML
10 INJECTION INTRAMUSCULAR; INTRAVENOUS EVERY 6 HOURS PRN
Status: DISCONTINUED | OUTPATIENT
Start: 2022-03-07 | End: 2022-03-07 | Stop reason: HOSPADM

## 2022-03-07 RX ORDER — ONDANSETRON 4 MG/1
4 TABLET, ORALLY DISINTEGRATING ORAL EVERY 6 HOURS PRN
Status: DISCONTINUED | OUTPATIENT
Start: 2022-03-07 | End: 2022-03-07 | Stop reason: HOSPADM

## 2022-03-07 RX ORDER — DEXTROSE MONOHYDRATE 25 G/50ML
25-50 INJECTION, SOLUTION INTRAVENOUS
Status: DISCONTINUED | OUTPATIENT
Start: 2022-03-07 | End: 2022-03-07 | Stop reason: HOSPADM

## 2022-03-07 RX ORDER — MISOPROSTOL 200 UG/1
400 TABLET ORAL
Status: DISCONTINUED | OUTPATIENT
Start: 2022-03-07 | End: 2022-03-07 | Stop reason: HOSPADM

## 2022-03-07 RX ORDER — NICOTINE POLACRILEX 4 MG
15-30 LOZENGE BUCCAL
Status: DISCONTINUED | OUTPATIENT
Start: 2022-03-07 | End: 2022-03-07 | Stop reason: HOSPADM

## 2022-03-07 RX ORDER — DOCUSATE SODIUM 100 MG/1
100 CAPSULE, LIQUID FILLED ORAL DAILY
Status: DISCONTINUED | OUTPATIENT
Start: 2022-03-08 | End: 2022-03-08 | Stop reason: DRUGHIGH

## 2022-03-07 RX ORDER — MODIFIED LANOLIN
OINTMENT (GRAM) TOPICAL
Status: DISCONTINUED | OUTPATIENT
Start: 2022-03-07 | End: 2022-03-09 | Stop reason: HOSPADM

## 2022-03-07 RX ORDER — NICOTINE POLACRILEX 4 MG
15-30 LOZENGE BUCCAL
Status: DISCONTINUED | OUTPATIENT
Start: 2022-03-07 | End: 2022-03-09 | Stop reason: HOSPADM

## 2022-03-07 RX ORDER — PROCHLORPERAZINE MALEATE 10 MG
10 TABLET ORAL EVERY 6 HOURS PRN
Status: DISCONTINUED | OUTPATIENT
Start: 2022-03-07 | End: 2022-03-07 | Stop reason: HOSPADM

## 2022-03-07 RX ORDER — ACETAMINOPHEN 325 MG/1
650 TABLET ORAL EVERY 4 HOURS PRN
Status: DISCONTINUED | OUTPATIENT
Start: 2022-03-07 | End: 2022-03-09 | Stop reason: HOSPADM

## 2022-03-07 RX ORDER — IBUPROFEN 800 MG/1
800 TABLET, FILM COATED ORAL EVERY 6 HOURS PRN
Status: DISCONTINUED | OUTPATIENT
Start: 2022-03-07 | End: 2022-03-09 | Stop reason: HOSPADM

## 2022-03-07 RX ORDER — HYDROCORTISONE 2.5 %
CREAM (GRAM) TOPICAL 3 TIMES DAILY PRN
Status: DISCONTINUED | OUTPATIENT
Start: 2022-03-07 | End: 2022-03-09 | Stop reason: HOSPADM

## 2022-03-07 RX ORDER — PROCHLORPERAZINE 25 MG
25 SUPPOSITORY, RECTAL RECTAL EVERY 12 HOURS PRN
Status: DISCONTINUED | OUTPATIENT
Start: 2022-03-07 | End: 2022-03-07 | Stop reason: HOSPADM

## 2022-03-07 RX ORDER — CARBOPROST TROMETHAMINE 250 UG/ML
250 INJECTION, SOLUTION INTRAMUSCULAR
Status: DISCONTINUED | OUTPATIENT
Start: 2022-03-07 | End: 2022-03-09 | Stop reason: HOSPADM

## 2022-03-07 RX ORDER — OXYTOCIN/0.9 % SODIUM CHLORIDE 30/500 ML
100-340 PLASTIC BAG, INJECTION (ML) INTRAVENOUS CONTINUOUS PRN
Status: DISCONTINUED | OUTPATIENT
Start: 2022-03-07 | End: 2022-03-08

## 2022-03-07 RX ADMIN — LIDOCAINE HYDROCHLORIDE 20 ML: 10 INJECTION, SOLUTION EPIDURAL; INFILTRATION; INTRACAUDAL; PERINEURAL at 20:41

## 2022-03-07 RX ADMIN — FENTANYL CITRATE 100 MCG: 50 INJECTION, SOLUTION INTRAMUSCULAR; INTRAVENOUS at 19:02

## 2022-03-07 RX ADMIN — Medication 340 ML/HR: at 20:40

## 2022-03-07 RX ADMIN — IBUPROFEN 600 MG: 600 TABLET, FILM COATED ORAL at 21:03

## 2022-03-07 ASSESSMENT — ACTIVITIES OF DAILY LIVING (ADL)
DRESSING/BATHING_DIFFICULTY: NO
FALL_HISTORY_WITHIN_LAST_SIX_MONTHS: NO
ADLS_ACUITY_SCORE: 5
CHANGE_IN_FUNCTIONAL_STATUS_SINCE_ONSET_OF_CURRENT_ILLNESS/INJURY: NO
CONCENTRATING,_REMEMBERING_OR_MAKING_DECISIONS_DIFFICULTY: NO
WALKING_OR_CLIMBING_STAIRS_DIFFICULTY: NO
ADLS_ACUITY_SCORE: 5
ADLS_ACUITY_SCORE: 5
DIFFICULTY_EATING/SWALLOWING: NO
TOILETING_ISSUES: NO
ADLS_ACUITY_SCORE: 5
WEAR_GLASSES_OR_BLIND: NO
DOING_ERRANDS_INDEPENDENTLY_DIFFICULTY: NO
ADLS_ACUITY_SCORE: 5

## 2022-03-08 LAB
GLUCOSE BLDC GLUCOMTR-MCNC: 79 MG/DL (ref 70–99)
T PALLIDUM AB SER QL: NONREACTIVE

## 2022-03-08 PROCEDURE — 250N000013 HC RX MED GY IP 250 OP 250 PS 637: Performed by: OBSTETRICS & GYNECOLOGY

## 2022-03-08 PROCEDURE — 120N000001 HC R&B MED SURG/OB

## 2022-03-08 RX ORDER — DOCUSATE SODIUM 100 MG/1
100 CAPSULE, LIQUID FILLED ORAL 2 TIMES DAILY
Status: DISCONTINUED | OUTPATIENT
Start: 2022-03-08 | End: 2022-03-09 | Stop reason: HOSPADM

## 2022-03-08 RX ORDER — PRENATAL VIT/IRON FUM/FOLIC AC 27MG-0.8MG
1 TABLET ORAL DAILY
Status: DISCONTINUED | OUTPATIENT
Start: 2022-03-08 | End: 2022-03-09 | Stop reason: HOSPADM

## 2022-03-08 RX ADMIN — DOCUSATE SODIUM 100 MG: 100 CAPSULE, LIQUID FILLED ORAL at 09:09

## 2022-03-08 RX ADMIN — PRENATAL VITAMINS-IRON FUMARATE 27 MG IRON-FOLIC ACID 0.8 MG TABLET 1 TABLET: at 09:10

## 2022-03-08 RX ADMIN — IBUPROFEN 800 MG: 800 TABLET ORAL at 16:16

## 2022-03-08 RX ADMIN — IBUPROFEN 800 MG: 800 TABLET ORAL at 09:10

## 2022-03-08 RX ADMIN — IBUPROFEN 800 MG: 800 TABLET ORAL at 02:59

## 2022-03-08 ASSESSMENT — ACTIVITIES OF DAILY LIVING (ADL)
ADLS_ACUITY_SCORE: 5

## 2022-03-08 NOTE — PROGRESS NOTES
[facility] Postpartum Progress Note     2022    DAILY NOTE - POSTPARTUM DAY 1    SUBJECTIVE: Doing well.    Pain controlled? Yes  Tolerating a regular diet? YES  Ambulating? YES  Voiding without difficulty? Yes  Lochia? minimal  Breastfeeding:  yes      OBJECTIVE:  Vitals:    22 2200 22 2215 22 2230 22 0245   BP: 104/54 98/53 92/53 95/54   Temp:    98.5  F (36.9  C)   TempSrc:    Oral   Weight:       Height:           Constitutional: healthy, alert and no distress    Abdomen:  Uterine fundus is firm, non-tender and at the level of the umbilicus      LE:  no edema, non-tender    LABS:  Hemoglobin   Date Value Ref Range Status   2022 12.9 11.7 - 15.7 g/dL Final   2021 11.4 (L) 11.7 - 15.7 g/dL Final   10/14/2019 14.9 11.7 - 15.7 g/dL Final   2019 12.5 11.7 - 15.7 g/dL Final     No results found for: RUBELLAABIGG   Lab Results   Component Value Date    ABO A 2019           Lab Results   Component Value Date    RH Pos 2019        ASSESSMENT:  Post-partum day #1  Vaginal delivery after   Doing well.     PLAN:   Ambulation encouraged  Breast feeding strategies discussed  Anticipate discharge tomorrow    Jennifer Lazo MD

## 2022-03-08 NOTE — PLAN OF CARE
Patient meeting expected goals. Is up independent in room, meeting all personal and infant needs. VSS. Pain is being managed with Ibuprofen.  Is mainly bottle feeding infant, but would like to also work on BF.  Colostrum easily noted with gentle HE. Patient aware to call out with next feeding so BF support may be given.

## 2022-03-08 NOTE — PLAN OF CARE
VS stable. Ibuprofen and tucks pads adequately controlling pain. FOB present and supportive. Independent with cares for self and infant.

## 2022-03-08 NOTE — H&P
L&D History and Physical   2022  Ashley Dias  [id number]      HPI: Ashley Dias is a 36 year old  at 39w4d by 13w5d US, here worsening contractions.     Pregnancy is complicated by poorly controlled gestational diabetes mellitus A1. Has been following with [facility] for  fetal ultrasound surveillance. Last growth ultrasound in Epic charting shows EFW at 79th percentile with AC measuring 92nd percentile. Amniotic fluid volume is normal.     She states that she is otherwise feeling well today.  She denies fever, HA, blurred vision, Nausea, vomiting, CP, SOB, abdominal pain, constipation, diarrhea, vaginal bleeding, LOF, abnormal vaginal discharge, and acute swelling.  +FM.      Complications of Pregnancy:  -   Patient Active Problem List   Diagnosis Code     Right shoulder pain M25.511     Insufficient prenatal care O09.30     Large for gestational age fetus affecting management of mother O36.60X0     White classification A1 gestational diabetes mellitus (GDM), diet controlled O24.410         OBHX:   OB History    Para Term  AB Living   5 4 4 0 0 4   SAB IAB Ectopic Multiple Live Births   0 0 0 0 4      # Outcome Date GA Lbr Abdirahman/2nd Weight Sex Delivery Anes PTL Lv   5 Current            4 Term 19 41w3d 03:04 / 00:14 4.01 kg (8 lb 13.5 oz) M Vag-Spont IV N JALEESA      Name: Amir      Apgar1: 7  Apgar5: 9   3 Term 16 39w6d / 00:03 3.79 kg (8 lb 5.7 oz) F Vag-Spont Local, IV  JALEESA      Name: ARNAUD DIAS      Apgar1: 9  Apgar5: 9   2 Term 14 40w1d 06:13 / 00:08 3.87 kg (8 lb 8.5 oz) M Vag-Spont Local  JALEESA      Apgar1: 8  Apgar5: 9   1 Term 13 40w0d  2.722 kg (6 lb) F Vag-Spont EPI  JALEESA       MedicalHX:   Past Medical History:   Diagnosis Date     Constipation      Dislocation     R shoulder chronic       SurgicalHX:   History reviewed. No pertinent surgical history.    Medications:   No current facility-administered medications on file prior to  "encounter.  docusate sodium (COLACE) 100 MG capsule, Take 100 mg by mouth 2 times daily  Prenatal Vit-Fe Fumarate-FA (PRENATAL MULTIVITAMIN  PLUS IRON) 27-0.8 MG TABS, Take 1 tablet by mouth daily   acetone urine (KETOSTIX) test strip, Test daily, when negative for 1 week then reduce to once weekly.  blood glucose (NO BRAND SPECIFIED) lancets standard, Use to test blood sugar 4 times daily or as directed.  blood glucose (NO BRAND SPECIFIED) test strip, Use to test blood sugar 4 times daily or as directed.  blood glucose monitoring (NO BRAND SPECIFIED) meter device kit, Use to test blood sugar 4 times daily or as directed.  blood glucose monitoring (SOFTCLIX) lancets, USE TO TEST BLOOD SUGAR FOUR TIMES DAILY OR AS DIRECTED        Allergies:  No Known Allergies    FamilyHX:  Family History   Problem Relation Age of Onset     Hypertension Mother      Diabetes Mother        SocialHX:   Social History     Socioeconomic History     Marital status: Single     Spouse name: Carolin     Number of children: 4     Years of education: None     Highest education level: None   Occupational History     Occupation: CNA     Employer: [employer]   Tobacco Use     Smoking status: Never Smoker     Smokeless tobacco: Never Used   Vaping Use     Vaping Use: Never used   Substance and Sexual Activity     Alcohol use: No     Drug use: No     Sexual activity: Yes     Partners: Male     Comment: Pregnant   Other Topics Concern     None   Social History Narrative     None     Social Determinants of Health     Financial Resource Strain: Not on file   Food Insecurity: Not on file   Transportation Needs: Not on file   Physical Activity: Not on file   Stress: Not on file   Social Connections: Not on file   Intimate Partner Violence: Not on file   Housing Stability: Not on file       ROS: 10-point ROS negative except as in HPI     Physical Exam:  Vitals:    03/07/22 1835   Weight: 74.8 kg (165 lb)   Height: 1.676 m (5' 6\")     GEN: " resting comfortably in bed, in NAD   CVS: RRR, no murmur appreciated   PULMONARY: CTAB, no increased work of breathing, no cough/wheeze   ABDOMEN: soft, gravid, non-tender, non-distended  EXTREMITIES: trace edema, non tender to palpation  CVX: 6/100/-1  Presentation: cephalic by cervix check  EFW: LGA    NST:  FHT: baseline 160, moderate variability, accelerations present, no decelerations  TOCO: q4-5 minutes       Ultrasounds:  Reviewed in Epic charting    Labs:   No results found for this or any previous visit (from the past 24 hour(s)).      Lab Results   Component Value Date    ABO A 2019    RH Pos 2019    AS Negative 09/10/2021    HEPBANG Nonreactive 09/10/2021    CHPCRT Negative 09/10/2021    GCPCRT Negative 09/10/2021    TREPAB Negative 2016    HGB 11.4 (L) 2021    HIV nonreactive 2013       GBS Status:   Lab Results   Component Value Date    GBS Negative 2019       Lab Results   Component Value Date    PAP NIL 2019       A/P: Ashley Dias is a 36 year old female  at 39w4d by 13w5d US, here for active labor    Admit for: Active labor; obtain routine labs. Assess blood glucose levels given poorly compliant GDMA1 status; consideration for IV insulin drip while in labor if blood glucose levels are elevated  FHT: Category 1 tracing  GBS status: Negative; antibiotics not indicated.   Pain management: Declines regional anesthesia; if needed, will consult anesthesiologist for administration; appreciate coordination of cares.     Shorty Esqueda MD  [facility]

## 2022-03-08 NOTE — L&D DELIVERY NOTE
Delivery Summary    Ashley Dias MRN# [id number]   Age: 36 year old YOB: 1985       Ms. Ashley Dias 36 year old  39w4d presents in active labor.   GBS negative status  Pregnancy complicated by poorly compliant GDMA1 and also fetal hepatic cyst with recommendation for  follow-up  Cervix on admission was 6/100/-1 at 1817, 3/7  Category 1 fetal heart tracing   Declines epidural   Progressed to complete/+1 station at 2030, 3/7     at , 3/7  Viable male infant delivered in cephalic presentation  Terminal meconium  No nuchal cord  Weight 8 lbs 11 oz  Apgar 9 and 9 at 1 and 5 minutes, respectively  Placenta delivered spontaneously, intact    mL   Infant name to be determined    Shorty Esqueda MD  [facility]        [place] Dias-Ashley [[id number]]    Labor Event Times    Labor onset date: 3/7/22 Onset time: 12:00 PM      Labor Events     labor?: No   steroids: None  Labor Type: Spontaneous  Predominate monitoring during 1st stage: continuous electronic fetal monitoring     Antibiotics received during labor?: No     Rupture date/time: 3/7/22 2026   Fluid color: Clear     Augmentation: AROM     Delivery/Placenta Date and Time    Delivery Date: 3/7/22 Delivery Time:  8:34 PM   Placenta Date/Time: 3/7/2022  8:50 PM  Oxytocin given at the time of delivery: after delivery of baby  Delivering clinician: Shorty Esqueda MD   Other personnel present at delivery:  Provider Role   Ameena Burciaga, RN Registered Nurse   Graciela Trinh RN Registered Nurse   Shorty Esqueda MD Obstetrician         Vaginal Counts     Initial count performed by 2 team members:  Two Team Members   Ameena Esqueda       Needles Suture Needles Sponges (RETIRED) Instruments   Initial counts 2  5    Added to count  1     Relief counts       Final counts 2 1 5          Placed during labor Accounted for at the end of labor   FSE No   "  IUPC No    Cervadil No               Final count performed by 2 team members:  Two Team Members   betty Esqueda      Final count correct?: Yes     Cord    Vessels: 3 Vessels    Cord Complications: None               Cord Blood Disposition: Lab    Gases Sent?: No    Delayed cord clamping?: Yes    Cord Clamping Delay (seconds): 31-60 seconds    Stem cell collection?: No        Resuscitation    Output in Delivery Room: Stool     [place] Measurements    Weight: 8 lb 11 oz Length: 1' 8\"   Head circumference: 33 cm    Output in delivery room: Stool     Labor Events and Shoulder Dystocia    Fetal Tracing Prior to Delivery: Category 1  Shoulder dystocia present?: Neg     Delivery (Maternal) (Provider to Complete) ([id number])    Episiotomy: None  Perineal lacerations: 2nd Repaired?: Yes   Repair suture: 2-0 Vicryl  Number of repair packets: 1  Genital tract inspection done: Pos     Blood Loss  Mother: Ashley Dias #[id number]   Start of Mother's Information    Delivery Blood Loss  22 1200 - 22 2112    Delivery QBL (mL) Hospital Encounter 123 mL    Total  123 mL         End of Mother's Information  Mother: Ashley Dias #[id number]          Delivery - Provider to Complete ([id number])    Delivering clinician: Shorty Esqueda MD  Attempted Delivery Types (Choose all that apply): Spontaneous Vaginal Delivery  Delivery Type (Choose the 1 that will go to the Birth History): Vaginal, Spontaneous                   Other personnel:  Provider Role   Ameena Burciaga RN Registered Nurse   Betty Trinh RN Registered Nurse   Shorty Esqueda MD Obstetrician                Placenta    Date/Time: 3/7/2022  8:50 PM  Removal: Spontaneous  Disposition: Pathology           Anesthesia    Method: INTRAVENOUS                 Presentation and Position    Presentation: Vertex    Position: Middle Occiput Anterior                 Shorty Esqueda MD  "

## 2022-03-08 NOTE — PROVIDER NOTIFICATION
03/07/22 1823 03/07/22 1824   Provider Notification   Provider Name/Title Dr. Dheeraj Esqueda   Method of Notification In Department At Bedside   Notification Reason Status Update;SVE  (6/100/-1, grand multip, un-medicated, gbs-, contrx)  --

## 2022-03-08 NOTE — PROVIDER NOTIFICATION
03/07/22 2023   Provider Notification   Provider Name/Title Dr. Esqueda   Method of Notification At Bedside   Request Attend Delivery

## 2022-03-09 VITALS
HEART RATE: 69 BPM | BODY MASS INDEX: 26.52 KG/M2 | SYSTOLIC BLOOD PRESSURE: 104 MMHG | DIASTOLIC BLOOD PRESSURE: 56 MMHG | WEIGHT: 165 LBS | TEMPERATURE: 98.2 F | HEIGHT: 66 IN | RESPIRATION RATE: 16 BRPM

## 2022-03-09 PROCEDURE — 250N000013 HC RX MED GY IP 250 OP 250 PS 637: Performed by: OBSTETRICS & GYNECOLOGY

## 2022-03-09 RX ORDER — IBUPROFEN 600 MG/1
600 TABLET, FILM COATED ORAL EVERY 6 HOURS PRN
Qty: 60 TABLET | Refills: 0 | Status: SHIPPED | OUTPATIENT
Start: 2022-03-09

## 2022-03-09 RX ADMIN — DOCUSATE SODIUM 100 MG: 100 CAPSULE, LIQUID FILLED ORAL at 09:05

## 2022-03-09 RX ADMIN — IBUPROFEN 800 MG: 800 TABLET ORAL at 00:56

## 2022-03-09 RX ADMIN — IBUPROFEN 800 MG: 800 TABLET ORAL at 16:09

## 2022-03-09 RX ADMIN — IBUPROFEN 800 MG: 800 TABLET ORAL at 09:05

## 2022-03-09 RX ADMIN — PRENATAL VITAMINS-IRON FUMARATE 27 MG IRON-FOLIC ACID 0.8 MG TABLET 1 TABLET: at 09:05

## 2022-03-09 ASSESSMENT — ACTIVITIES OF DAILY LIVING (ADL)
ADLS_ACUITY_SCORE: 5
DEPENDENT_IADLS:: INDEPENDENT
ADLS_ACUITY_SCORE: 5

## 2022-03-09 NOTE — DISCHARGE INSTRUCTIONS
Postpartum Vaginal Delivery Instructions  Follow up with OB in 6 weeks for postpartum visit    Activity       Ask family and friends for help when you need it.    Do not place anything in your vagina for 6 weeks.    You are not restricted on other activities, but take it easy for a few weeks to allow your body to recover from delivery.  You are able to do any activities you feel up to that point.    No driving until you have stopped taking your pain medications (usually two weeks after delivery).     Call your health care provider if you have any of these symptoms:       Increased pain, swelling, redness, or fluid around your stiches from an episiotomy or perineal tear.    A fever above 100.4 F (38 C) with or without chills when placing a thermometer under your tongue.    You soak a sanitary pad with blood within 1 hour, or you see blood clots larger than a golf ball.    Bleeding that lasts more than 6 weeks.    Vaginal discharge that smells bad.    Severe pain, cramping or tenderness in your lower belly area.    A need to urinate more frequently (use the toilet more often), more urgently (use the toilet very quickly), or it burns when you urinate.    Nausea and vomiting.    Redness, swelling or pain around a vein in your leg.    Problems breastfeeding or a red or painful area on your breast.    Chest pain and cough or are gasping for air.    Problems coping with sadness, anxiety, or depression.  If you have any concerns about hurting yourself or the baby, call your provider immediately.     You have questions or concerns after you return home.     Keep your hands clean:  Always wash your hands before touching your perineal area and stitches.  This helps reduce your risk of infection.  If your hands aren't dirty, you may use an alcohol hand-rub to clean your hands. Keep your nails clean and short.

## 2022-03-09 NOTE — DISCHARGE SUMMARY
[facility] Obstetrics Post-Partum Progress Note          Assessment and Plan:    Assessment:   Post-partum day #2  Normal spontaneous vaginal delivery  L&D complications: GDMA1        Doing well.  No excessive bleeding  Pain well-controlled.  Normal BS PP      Plan:   Discharge later today  Follow up 6 weeks  Declines prescription analgesics           Interval History:   Doing well.  Pain is well-controlled.  No fevers.  No history of foul-smelling vaginal discharge.  Good appetite.  Denies chest pain, shortness of breath, nausea or vomiting.  Vaginal bleeding is similar to a heavy menstrual flow.  Ambulatory.  Breastfeeding well.          Significant Problems:      Past Medical History:   Diagnosis Date     Constipation      Dislocation     R shoulder chronic                   Physical Exam:     All vitals stable  Patient Vitals for the past 12 hrs:   BP Temp Temp src Pulse Resp   03/09/22 0052 100/63 97.5  F (36.4  C) Oral 70 16     Uterine fundus is firm, non-tender and at the level of the umbilicus  Ext NT w/o edema     Francisco Pina MD  3/9/2022 7:33 AM

## 2022-03-09 NOTE — PLAN OF CARE
Vital signs stable. Postpartum assessment WDL. Pain well controlled. Up ad/shaneka. Voiding w/o difficulty. Breastfeeding and bottle feeding  formula per parental choice. Bonding well with . Pt received complete discharge paperwork and home medications. Pt received a prescription for a breast pump as she wanted to not get one here but take the prescription with her. ROP paper work given and the number for [place], the Birth Certificate ita, was given to parents to make an apt. Discharge outcomes on care plan met. Pt states understanding and comfort with self care and follow up care. Pt had no further questions at the time of discharge and no unmet needs were identified. ID bands double checked and verified with parents and . Electronic security band removed from . Pt discharged @1800.

## 2022-03-09 NOTE — PLAN OF CARE
Patient up independently. Voiding without difficulty. VSS. Pain controlled with ibuprofen. Planning to discharge this evening.

## 2022-03-09 NOTE — PLAN OF CARE
Vital signs stable. Postpartum assessment WDL. Pain well controlled. Up ad/shaneka. Breastfeeding and bottle feeding  formula per parental choice. Bonding well with . Will continue to monitor. Questions/concerns addressed.

## 2022-03-09 NOTE — PLAN OF CARE
Patient vital signs stable and meeting expected outcomes.  Breast and formula feeding independently.  Up independently and voiding adequately.  Pain well controlled with ibuprofen.  Able to perform all cares for self and infant.  Bonding well with baby.  Plan to discharge home today.  Will continue to monitor.

## 2022-03-10 LAB
PATH REPORT.COMMENTS IMP SPEC: NORMAL
PATH REPORT.COMMENTS IMP SPEC: NORMAL
PATH REPORT.FINAL DX SPEC: NORMAL
PATH REPORT.GROSS SPEC: NORMAL
PATH REPORT.MICROSCOPIC SPEC OTHER STN: NORMAL
PATH REPORT.RELEVANT HX SPEC: NORMAL
PHOTO IMAGE: NORMAL

## 2022-06-17 ENCOUNTER — PRENATAL OFFICE VISIT (OUTPATIENT)
Dept: OBGYN | Facility: CLINIC | Age: 37
End: 2022-06-17
Payer: COMMERCIAL

## 2022-06-17 VITALS — DIASTOLIC BLOOD PRESSURE: 64 MMHG | SYSTOLIC BLOOD PRESSURE: 116 MMHG | BODY MASS INDEX: 25.78 KG/M2 | WEIGHT: 159.7 LBS

## 2022-06-17 PROCEDURE — 99212 OFFICE O/P EST SF 10 MIN: CPT | Performed by: OBSTETRICS & GYNECOLOGY

## 2022-06-17 ASSESSMENT — PATIENT HEALTH QUESTIONNAIRE - PHQ9: SUM OF ALL RESPONSES TO PHQ QUESTIONS 1-9: 5

## 2022-06-17 NOTE — PROGRESS NOTES
SUBJECTIVE:  Ashley Dias,  is here for a postpartum visit.  She had a  on 3/7/22 delivering a healthy baby boy, named Eve weighing 8 lbs 11 oz at term.      HPI:  Uncomplicated  following spontaneous labor at term    Last PHQ-9 score on record=   PHQ-9 SCORE 2019   PHQ-9 Total Score 2     No flowsheet data found.    Delivery complications:  No  Breast feeding:  Yes, going well  Bladder problems:  No  Bowel problems/hemorrhoids:  No  Episiotomy/laceration/incision healed? Yes: 2nd degree  Vaginal flow:  None  Atalissa:  No  Contraception: unsure, maybe IUD which was discussed and was shown insertion process  Emotional adjustment:  doing well and happy  Back to work:  yes    12 point review of systems negative other than symptoms noted below.    OBJECTIVE:  Vitals: /64   Wt 72.4 kg (159 lb 11.2 oz)   Breastfeeding Yes   BMI 25.78 kg/m    BMI= Body mass index is 25.78 kg/m .  General - pleasant female in no acute distress.  Breast -  deferred  Abdomen - No incision  Pelvic - EG: normal adult female, BUS: within normal limits, Vagina: well rugated, no discharge, Cervix: no lesions or CMT, Uterus: firm, normal sized and nontender, anteverted in position. Adnexae: no masses or tenderness.  Rectovaginal - deferred.    ASSESSMENT:    ICD-10-CM    1. Routine postpartum follow-up  Z39.2        PLAN:  May resume normal activities without restrictions.  Pap smear was not done today.    Full counseling was provided, and all questions were answered.   Return to clinic in one year for an annual visit.   RTC for IUD insertion or contraceptive alternative    Aldo Pina MD

## 2022-08-24 ENCOUNTER — OFFICE VISIT (OUTPATIENT)
Dept: URGENT CARE | Facility: URGENT CARE | Age: 37
End: 2022-08-24
Payer: COMMERCIAL

## 2022-08-24 VITALS
SYSTOLIC BLOOD PRESSURE: 112 MMHG | HEART RATE: 68 BPM | DIASTOLIC BLOOD PRESSURE: 70 MMHG | OXYGEN SATURATION: 98 % | TEMPERATURE: 98 F | RESPIRATION RATE: 20 BRPM

## 2022-08-24 DIAGNOSIS — J01.90 ACUTE SINUSITIS WITH SYMPTOMS > 10 DAYS: Primary | ICD-10-CM

## 2022-08-24 PROCEDURE — 99213 OFFICE O/P EST LOW 20 MIN: CPT | Performed by: PHYSICIAN ASSISTANT

## 2022-08-24 RX ORDER — FLUTICASONE PROPIONATE 50 MCG
1 SPRAY, SUSPENSION (ML) NASAL DAILY
Qty: 16 G | Refills: 0 | Status: SHIPPED | OUTPATIENT
Start: 2022-08-24

## 2022-08-24 NOTE — PROGRESS NOTES
Assessment & Plan     1. Acute sinusitis with symptoms > 10 days  Patient has been dealing with left-sided facial pain for the past 5 months, worsening.  We will treat for sinusitis with Augmentin and Flonase.  Discussed that she should see improvement in the next 4 to 5 days.  If she does not, I would like her to follow-up with PCP for further evaluation (+/- facial CT etc) may need ENT consult due to length of time facial pain has been present.   - amoxicillin-clavulanate (AUGMENTIN) 875-125 MG tablet; Take 1 tablet by mouth 2 times daily for 10 days  Dispense: 20 tablet; Refill: 0  - fluticasone (FLONASE) 50 MCG/ACT nasal spray; Spray 1 spray into both nostrils daily  Dispense: 16 g; Refill: 0        Return in about 5 days (around 8/29/2022), or if symptoms worsen or fail to improve, for PCP.    Diagnosis and treatment plan was reviewed with patient and/or family.   We went over any labs or imaging. Discussed worsening symptoms or little to no relief despite treatment plan to follow-up with PCP or return to clinic.  Patient verbalizes understanding. All questions were addressed and answered.     [facility] [place]    CHIEF COMPLAINT:   Chief Complaint   Patient presents with     Sinus Problem     Poss sinus infection facial pain      Subjective     Ashley is a 36 year old female who presents to clinic today for evaluation of left sided facial pain.  Had pain like this prior to becoming pregnant, improved with pregnancy and now has returned.  Has been worsening over the past several weeks.  Pain is located on the right side of her face, into her tooth and cheek.  She has been taking Tylenol and ibuprofen which helps with the pain.  She saw a dentist at that time it was noted that her teeth were normal, but she had a sinus infection.  She has not had fever or ear pain.  No drainage from her nose or postnasal drainage.      Past Medical History:   Diagnosis Date      Constipation      Dislocation     R shoulder chronic     History reviewed. No pertinent surgical history.  Social History     Tobacco Use     Smoking status: Never Smoker     Smokeless tobacco: Never Used   Substance Use Topics     Alcohol use: No     Current Outpatient Medications   Medication     amoxicillin-clavulanate (AUGMENTIN) 875-125 MG tablet     fluticasone (FLONASE) 50 MCG/ACT nasal spray     acetone urine (KETOSTIX) test strip     blood glucose (NO BRAND SPECIFIED) lancets standard     blood glucose (NO BRAND SPECIFIED) test strip     blood glucose monitoring (NO BRAND SPECIFIED) meter device kit     blood glucose monitoring (SOFTCLIX) lancets     docusate sodium (COLACE) 100 MG capsule     ibuprofen (ADVIL/MOTRIN) 600 MG tablet     Prenatal Vit-Fe Fumarate-FA (PRENATAL MULTIVITAMIN  PLUS IRON) 27-0.8 MG TABS     No current facility-administered medications for this visit.     No Known Allergies    10 point ROS of systems were all negative except for pertinent positives noted in my HPI.      Exam:   /70   Pulse 68   Temp 98  F (36.7  C)   Resp 20   SpO2 98%   Constitutional: healthy, alert and no distress  Head: Normocephalic, atraumatic.  Eyes: conjunctiva clear, no drainage  ENT: TMs clear and shiny leila, nasal mucosa pink and moist, throat without tonsillar hypertrophy or erythema. Left sided maxillary sinus TTP. No swelling or erythema noted. No gum swelling or erythema.   Neck: neck is supple, no cervical lymphadenopathy or nuchal rigidity  Cardiovascular: RRR  Respiratory: CTA bilaterally, no rhonchi or rales  Skin: no rashes  Neurologic: Speech clear, gait normal. Moves all extremities.    No results found for any visits on 08/24/22.

## 2022-08-24 NOTE — PATIENT INSTRUCTIONS
Start taking Augmentin and Flonase for a sinus infection   You should see improvement in 3-5 days   If your symptoms do not get better or return right after finished antibiotics, please follow-up with PCP for further treatment

## 2022-09-10 ENCOUNTER — HEALTH MAINTENANCE LETTER (OUTPATIENT)
Age: 37
End: 2022-09-10

## 2022-11-16 ENCOUNTER — DOCUMENTATION ONLY (OUTPATIENT)
Dept: LAB | Facility: CLINIC | Age: 37
End: 2022-11-16

## 2022-11-16 DIAGNOSIS — O24.410 DIET CONTROLLED GESTATIONAL DIABETES MELLITUS (GDM), ANTEPARTUM: Primary | ICD-10-CM

## 2022-11-16 NOTE — PROGRESS NOTES
Hi-    Patient has a lab appointment on 11/21/2022. Please place necessary future lab orders. Thank you! MAXIME Heard

## 2022-11-28 ENCOUNTER — LAB (OUTPATIENT)
Dept: LAB | Facility: CLINIC | Age: 37
End: 2022-11-28
Payer: COMMERCIAL

## 2022-11-28 DIAGNOSIS — O24.410 DIET CONTROLLED GESTATIONAL DIABETES MELLITUS (GDM), ANTEPARTUM: ICD-10-CM

## 2022-11-28 LAB
NON GESTATIONAL GTT 2 HR POST DOSE: 143 MG/DL (ref 60–199)
NON GESTATIONAL GTT FASTING: 85 MG/DL (ref 60–125)

## 2022-11-28 PROCEDURE — 82947 ASSAY GLUCOSE BLOOD QUANT: CPT

## 2022-11-28 PROCEDURE — 82950 GLUCOSE TEST: CPT

## 2022-11-28 PROCEDURE — 36415 COLL VENOUS BLD VENIPUNCTURE: CPT

## 2022-12-24 ENCOUNTER — OFFICE VISIT (OUTPATIENT)
Dept: URGENT CARE | Facility: URGENT CARE | Age: 37
End: 2022-12-24
Payer: COMMERCIAL

## 2022-12-24 VITALS
WEIGHT: 161.5 LBS | BODY MASS INDEX: 26.07 KG/M2 | HEART RATE: 84 BPM | OXYGEN SATURATION: 98 % | TEMPERATURE: 99.5 F | SYSTOLIC BLOOD PRESSURE: 111 MMHG | DIASTOLIC BLOOD PRESSURE: 71 MMHG

## 2022-12-24 DIAGNOSIS — J34.89 TENDERNESS OVER MAXILLARY SINUS: ICD-10-CM

## 2022-12-24 DIAGNOSIS — K04.7 DENTAL INFECTION: Primary | ICD-10-CM

## 2022-12-24 PROCEDURE — 99213 OFFICE O/P EST LOW 20 MIN: CPT | Performed by: PHYSICIAN ASSISTANT

## 2022-12-24 ASSESSMENT — ENCOUNTER SYMPTOMS
RHINORRHEA: 0
COUGH: 0
ABDOMINAL PAIN: 0
FACIAL SWELLING: 1
FEVER: 1
VOMITING: 0

## 2022-12-24 NOTE — LETTER
[facility] [place]  [address]  [address]  [place] [address]  Phone: [phone]  Fax: [phone]    December 24, 2022        Ashley Dias  [address]  [place] [address]          To whom it may concern:    RE: Ashley Dias    Patient was seen and treated today at our clinic and missed work due to infection.     Please contact me for questions or concerns.      Sincerely,        Kamala Snyder PA-C

## 2022-12-24 NOTE — PROGRESS NOTES
Assessment & Plan:        ICD-10-CM    1. Dental infection  K04.7 amoxicillin-clavulanate (AUGMENTIN) 875-125 MG tablet      2. Tenderness over maxillary sinus  J34.89             Plan/Clinical Decision Making:    Patient with left upper molar tenderness with facial swelling, some maxillary sinus tenderness. Normal ear, lung and neck exam. No facial cellulitis.   Will treat with course of Augmentin, reviewed side effects.   Needs to follow up with dentist for xray, further evaluation.       Return if symptoms worsen or fail to improve, for in 5-7 days.     At the end of the encounter, I discussed results, diagnosis, medications. Discussed red flags for immediate return to clinic/ER, as well as indications for follow up if no improvement. Patient understood and agreed to plan. Patient was stable for discharge.        Kamala Snyder PA-C on 12/24/2022 at 9:33 AM          Subjective:     HPI:    Ashley is a 37 year old female who presents to clinic today for the following health issues:  Chief Complaint   Patient presents with     POSS SINUS INFECTION     ONSET OF SINUS PRESSURED AND PAIN A LITTLE MORE THAN A WEEK      HPI    Patient complains of left sinus pain, sensitivity of teeth on left side. Cheek feels swollen, left ear pain.   Pain for two days. Patient taking ibuprofen. Felt feverish.   Has hx of issues with sinusitis.   Saw dentist over a year ago, was pregnant, no xrays done, hx of dental cavities in past.     History obtained from the patient.    Review of Systems   Constitutional: Positive for fever.   HENT: Positive for congestion (mild on left side), ear pain and facial swelling. Negative for rhinorrhea.    Respiratory: Negative for cough.    Gastrointestinal: Negative for abdominal pain and vomiting.         Patient Active Problem List   Diagnosis     Right shoulder pain     Insufficient prenatal care     Large for gestational age fetus affecting management of mother     White classification A1  gestational diabetes mellitus (GDM), diet controlled        Past Medical History:   Diagnosis Date     Constipation      Dislocation     R shoulder chronic       Social History     Tobacco Use     Smoking status: Never     Smokeless tobacco: Never   Substance Use Topics     Alcohol use: No             Objective:     Vitals:    12/24/22 0911   BP: 111/71   Pulse: 84   Temp: 99.5  F (37.5  C)   SpO2: 98%   Weight: 73.3 kg (161 lb 8 oz)         Physical Exam   EXAM:   Pleasant, alert, appropriate appearance. NAD.  Head Exam: Normocephalic, atraumatic.  Eye Exam:   non icteric/injection.    Ear Exam: TMs grey without bulging. Normal canals.  Normal pinna.  Nose Exam: Normal external nose.  Normal turbinates, no drainage.   OroPharynx Exam:  Moist mucous membranes. No erythema, pharynx without exudate or hypertrophy.  Left upper molar tenderness, left facial swelling with maxillary tenderness.   Neck/Thyroid Exam:  No LAD.    Chest/Respiratory Exam: CTAB.  Cardiovascular Exam: RRR. No murmur or rubs.      Results:  No results found for any visits on 12/24/22.

## 2023-04-30 ENCOUNTER — HEALTH MAINTENANCE LETTER (OUTPATIENT)
Age: 38
End: 2023-04-30

## 2024-07-07 ENCOUNTER — HEALTH MAINTENANCE LETTER (OUTPATIENT)
Age: 39
End: 2024-07-07

## 2025-07-19 ENCOUNTER — HEALTH MAINTENANCE LETTER (OUTPATIENT)
Age: 40
End: 2025-07-19